# Patient Record
Sex: FEMALE | Race: WHITE | URBAN - METROPOLITAN AREA
[De-identification: names, ages, dates, MRNs, and addresses within clinical notes are randomized per-mention and may not be internally consistent; named-entity substitution may affect disease eponyms.]

---

## 2019-09-12 ENCOUNTER — INPATIENT (INPATIENT)
Facility: HOSPITAL | Age: 54
LOS: 5 days | Discharge: HOME HEALTH | End: 2019-09-18
Attending: HOSPITALIST | Admitting: HOSPITALIST
Payer: MEDICAID

## 2019-09-12 VITALS
HEIGHT: 60 IN | TEMPERATURE: 97 F | SYSTOLIC BLOOD PRESSURE: 119 MMHG | WEIGHT: 104.94 LBS | DIASTOLIC BLOOD PRESSURE: 55 MMHG | OXYGEN SATURATION: 99 % | HEART RATE: 70 BPM | RESPIRATION RATE: 18 BRPM

## 2019-09-12 DIAGNOSIS — K21.9 GASTRO-ESOPHAGEAL REFLUX DISEASE WITHOUT ESOPHAGITIS: ICD-10-CM

## 2019-09-12 DIAGNOSIS — L03.119 CELLULITIS OF UNSPECIFIED PART OF LIMB: ICD-10-CM

## 2019-09-12 DIAGNOSIS — F41.9 ANXIETY DISORDER, UNSPECIFIED: ICD-10-CM

## 2019-09-12 DIAGNOSIS — Z98.891 HISTORY OF UTERINE SCAR FROM PREVIOUS SURGERY: Chronic | ICD-10-CM

## 2019-09-12 LAB
ALBUMIN SERPL ELPH-MCNC: 4.4 G/DL — SIGNIFICANT CHANGE UP (ref 3.5–5.2)
ALP SERPL-CCNC: 100 U/L — SIGNIFICANT CHANGE UP (ref 30–115)
ALT FLD-CCNC: 10 U/L — SIGNIFICANT CHANGE UP (ref 0–41)
ANION GAP SERPL CALC-SCNC: 10 MMOL/L — SIGNIFICANT CHANGE UP (ref 7–14)
AST SERPL-CCNC: 15 U/L — SIGNIFICANT CHANGE UP (ref 0–41)
BASOPHILS # BLD AUTO: 0.06 K/UL — SIGNIFICANT CHANGE UP (ref 0–0.2)
BASOPHILS NFR BLD AUTO: 0.5 % — SIGNIFICANT CHANGE UP (ref 0–1)
BILIRUB SERPL-MCNC: <0.2 MG/DL — SIGNIFICANT CHANGE UP (ref 0.2–1.2)
BUN SERPL-MCNC: 23 MG/DL — HIGH (ref 10–20)
CALCIUM SERPL-MCNC: 9.8 MG/DL — SIGNIFICANT CHANGE UP (ref 8.5–10.1)
CHLORIDE SERPL-SCNC: 103 MMOL/L — SIGNIFICANT CHANGE UP (ref 98–110)
CO2 SERPL-SCNC: 28 MMOL/L — SIGNIFICANT CHANGE UP (ref 17–32)
CREAT SERPL-MCNC: 0.7 MG/DL — SIGNIFICANT CHANGE UP (ref 0.7–1.5)
EOSINOPHIL # BLD AUTO: 0.23 K/UL — SIGNIFICANT CHANGE UP (ref 0–0.7)
EOSINOPHIL NFR BLD AUTO: 1.9 % — SIGNIFICANT CHANGE UP (ref 0–8)
ERYTHROCYTE [SEDIMENTATION RATE] IN BLOOD: 25 MM/HR — HIGH (ref 0–20)
GLUCOSE SERPL-MCNC: 87 MG/DL — SIGNIFICANT CHANGE UP (ref 70–99)
HCG SERPL-ACNC: <0.6 MIU/ML — SIGNIFICANT CHANGE UP
HCT VFR BLD CALC: 39.1 % — SIGNIFICANT CHANGE UP (ref 37–47)
HGB BLD-MCNC: 13.1 G/DL — SIGNIFICANT CHANGE UP (ref 12–16)
IMM GRANULOCYTES NFR BLD AUTO: 0.3 % — SIGNIFICANT CHANGE UP (ref 0.1–0.3)
LYMPHOCYTES # BLD AUTO: 1.22 K/UL — SIGNIFICANT CHANGE UP (ref 1.2–3.4)
LYMPHOCYTES # BLD AUTO: 9.9 % — LOW (ref 20.5–51.1)
MCHC RBC-ENTMCNC: 32.9 PG — HIGH (ref 27–31)
MCHC RBC-ENTMCNC: 33.5 G/DL — SIGNIFICANT CHANGE UP (ref 32–37)
MCV RBC AUTO: 98.2 FL — SIGNIFICANT CHANGE UP (ref 81–99)
MONOCYTES # BLD AUTO: 1.35 K/UL — HIGH (ref 0.1–0.6)
MONOCYTES NFR BLD AUTO: 10.9 % — HIGH (ref 1.7–9.3)
MRSA PCR RESULT.: NEGATIVE — SIGNIFICANT CHANGE UP
NEUTROPHILS # BLD AUTO: 9.43 K/UL — HIGH (ref 1.4–6.5)
NEUTROPHILS NFR BLD AUTO: 76.5 % — HIGH (ref 42.2–75.2)
NRBC # BLD: 0 /100 WBCS — SIGNIFICANT CHANGE UP (ref 0–0)
PLATELET # BLD AUTO: 220 K/UL — SIGNIFICANT CHANGE UP (ref 130–400)
POTASSIUM SERPL-MCNC: 4.2 MMOL/L — SIGNIFICANT CHANGE UP (ref 3.5–5)
POTASSIUM SERPL-SCNC: 4.2 MMOL/L — SIGNIFICANT CHANGE UP (ref 3.5–5)
PROT SERPL-MCNC: 7.3 G/DL — SIGNIFICANT CHANGE UP (ref 6–8)
RBC # BLD: 3.98 M/UL — LOW (ref 4.2–5.4)
RBC # FLD: 13.1 % — SIGNIFICANT CHANGE UP (ref 11.5–14.5)
SODIUM SERPL-SCNC: 141 MMOL/L — SIGNIFICANT CHANGE UP (ref 135–146)
WBC # BLD: 12.33 K/UL — HIGH (ref 4.8–10.8)
WBC # FLD AUTO: 12.33 K/UL — HIGH (ref 4.8–10.8)

## 2019-09-12 PROCEDURE — 99223 1ST HOSP IP/OBS HIGH 75: CPT

## 2019-09-12 PROCEDURE — 73610 X-RAY EXAM OF ANKLE: CPT | Mod: 26,LT

## 2019-09-12 PROCEDURE — 99285 EMERGENCY DEPT VISIT HI MDM: CPT

## 2019-09-12 PROCEDURE — 73630 X-RAY EXAM OF FOOT: CPT | Mod: 26,LT

## 2019-09-12 RX ORDER — COLLAGENASE CLOSTRIDIUM HIST. 250 UNIT/G
1 OINTMENT (GRAM) TOPICAL THREE TIMES A DAY
Refills: 0 | Status: DISCONTINUED | OUTPATIENT
Start: 2019-09-12 | End: 2019-09-12

## 2019-09-12 RX ORDER — DIAZEPAM 5 MG
5 TABLET ORAL THREE TIMES A DAY
Refills: 0 | Status: DISCONTINUED | OUTPATIENT
Start: 2019-09-12 | End: 2019-09-13

## 2019-09-12 RX ORDER — MORPHINE SULFATE 50 MG/1
2 CAPSULE, EXTENDED RELEASE ORAL EVERY 4 HOURS
Refills: 0 | Status: DISCONTINUED | OUTPATIENT
Start: 2019-09-12 | End: 2019-09-13

## 2019-09-12 RX ORDER — ACETAMINOPHEN 500 MG
650 TABLET ORAL EVERY 6 HOURS
Refills: 0 | Status: DISCONTINUED | OUTPATIENT
Start: 2019-09-12 | End: 2019-09-13

## 2019-09-12 RX ORDER — HYDROMORPHONE HYDROCHLORIDE 2 MG/ML
1 INJECTION INTRAMUSCULAR; INTRAVENOUS; SUBCUTANEOUS ONCE
Refills: 0 | Status: DISCONTINUED | OUTPATIENT
Start: 2019-09-12 | End: 2019-09-12

## 2019-09-12 RX ORDER — SODIUM HYPOCHLORITE 0.125 %
1 SOLUTION, NON-ORAL MISCELLANEOUS THREE TIMES A DAY
Refills: 0 | Status: DISCONTINUED | OUTPATIENT
Start: 2019-09-12 | End: 2019-09-12

## 2019-09-12 RX ORDER — PANTOPRAZOLE SODIUM 20 MG/1
40 TABLET, DELAYED RELEASE ORAL
Refills: 0 | Status: DISCONTINUED | OUTPATIENT
Start: 2019-09-12 | End: 2019-09-13

## 2019-09-12 RX ORDER — VANCOMYCIN HCL 1 G
1000 VIAL (EA) INTRAVENOUS EVERY 12 HOURS
Refills: 0 | Status: DISCONTINUED | OUTPATIENT
Start: 2019-09-12 | End: 2019-09-13

## 2019-09-12 RX ORDER — AZTREONAM 2 G
1 VIAL (EA) INJECTION
Qty: 20 | Refills: 0
Start: 2019-09-12 | End: 2019-09-21

## 2019-09-12 RX ORDER — SERTRALINE 25 MG/1
100 TABLET, FILM COATED ORAL DAILY
Refills: 0 | Status: DISCONTINUED | OUTPATIENT
Start: 2019-09-12 | End: 2019-09-13

## 2019-09-12 RX ORDER — KETOROLAC TROMETHAMINE 30 MG/ML
15 SYRINGE (ML) INJECTION ONCE
Refills: 0 | Status: DISCONTINUED | OUTPATIENT
Start: 2019-09-12 | End: 2019-09-12

## 2019-09-12 RX ORDER — VANCOMYCIN HCL 1 G
1000 VIAL (EA) INTRAVENOUS ONCE
Refills: 0 | Status: COMPLETED | OUTPATIENT
Start: 2019-09-12 | End: 2019-09-12

## 2019-09-12 RX ORDER — ENOXAPARIN SODIUM 100 MG/ML
40 INJECTION SUBCUTANEOUS DAILY
Refills: 0 | Status: DISCONTINUED | OUTPATIENT
Start: 2019-09-12 | End: 2019-09-13

## 2019-09-12 RX ADMIN — Medication 250 MILLIGRAM(S): at 11:17

## 2019-09-12 RX ADMIN — Medication 15 MILLIGRAM(S): at 11:38

## 2019-09-12 RX ADMIN — HYDROMORPHONE HYDROCHLORIDE 1 MILLIGRAM(S): 2 INJECTION INTRAMUSCULAR; INTRAVENOUS; SUBCUTANEOUS at 21:15

## 2019-09-12 RX ADMIN — HYDROMORPHONE HYDROCHLORIDE 1 MILLIGRAM(S): 2 INJECTION INTRAMUSCULAR; INTRAVENOUS; SUBCUTANEOUS at 20:39

## 2019-09-12 RX ADMIN — Medication 250 MILLIGRAM(S): at 18:24

## 2019-09-12 RX ADMIN — ENOXAPARIN SODIUM 40 MILLIGRAM(S): 100 INJECTION SUBCUTANEOUS at 18:24

## 2019-09-12 RX ADMIN — MORPHINE SULFATE 2 MILLIGRAM(S): 50 CAPSULE, EXTENDED RELEASE ORAL at 17:19

## 2019-09-12 NOTE — ED PROVIDER NOTE - OBJECTIVE STATEMENT
53 y f no pmh pw L foot pain and swelling. Picked something out of her L 2nd toe with tweezers about 1 week ago. Since then has been noticing swelling and clear drainage from 2nd toe. Today got out of bed and noted increased swelling to the L foot and drainage from the L 2nd toe. Toe pain radiating up towards foot and L ankle. Unable to bear weight over extremity. Denies fever, chills, n/v, back pain, ankle pain, calf pain, knee pain, decreased sensation, trauma.

## 2019-09-12 NOTE — ED PROVIDER NOTE - PHYSICAL EXAMINATION
CONSTITUTIONAL: Well-developed; well-nourished; in no acute distress.   SKIN: Erythema and swelling over L 2nd toe with mild erythema over foot.   HEAD: Normocephalic; atraumatic.  EYES: normal sclera and conjunctiva   ENT: No nasal discharge; airway clear.  NECK: Supple; non tender.  CARD: S1, S2 normal; no murmurs, gallops, or rubs. Regular rate and rhythm.   RESP: No wheezes, rales or rhonchi.  ABD: soft ntnd  EXT: Normal ROM.  No clubbing, cyanosis. L foot swelling and erythema. DP/Pt intact and equal bilaterally.   LYMPH: No acute cervical adenopathy.  NEURO: Alert, oriented, grossly unremarkable  PSYCH: Cooperative, appropriate.

## 2019-09-12 NOTE — ED PROVIDER NOTE - NSFOLLOWUPCLINICS_GEN_ALL_ED_FT
[facility]  Podiatry  .  [place]   Phone: [phone]  Fax:   Follow Up Time:

## 2019-09-12 NOTE — ED PROVIDER NOTE - PROGRESS NOTE DETAILS
D/W podiatry. Recommending admission for IVABX, xrays, labs including ESR and CRP. Patient states she needs to leave. Refusing admission and further treatment with antibiotics. Requesting pain medicine and to be sent home. The patient wishes to leave against medical advice.  I have discussed the risks, benefits and alternatives (including the possibility of worsening of disease, pain, permanent disability, and/or death) with the patient and his/her family (if available).  The patient voices understanding of these risks, benefits, and alternatives and still wishes to sign out against medical advice.  The patient is awake, alert, oriented  x 3 and has demonstrated capacity to refuse/direct care.  I have advised the patient that they can and should return immediately should they develop any worse/different/additional symptoms, or if they change their mind and want to continue their care. Patient refusing to sign ama paperwork, states she does not want to sign anything at this time. Pt returned to ED after smoking a cigarette stating she was only leaving for a moment. Now agreeing to be admitted.

## 2019-09-12 NOTE — H&P ADULT - NSHPPHYSICALEXAM_GEN_ALL_CORE
Vital Signs Last 24 Hrs  T(C): 37.1 (12 Sep 2019 15:23), Max: 37.1 (12 Sep 2019 15:23)  T(F): 98.8 (12 Sep 2019 15:23), Max: 98.8 (12 Sep 2019 15:23)  HR: 60 (12 Sep 2019 15:23) (60 - 70)  BP: 135/80 (12 Sep 2019 15:23) (119/55 - 135/80)  BP(mean): --  RR: 18 (12 Sep 2019 15:23) (18 - 18)  SpO2: 100% (12 Sep 2019 15:23) (99% - 100%)    T(C): 37.1 (09-12-19 @ 15:23), Max: 37.1 (09-12-19 @ 15:23)  HR: 60 (09-12-19 @ 15:23) (60 - 70)  BP: 135/80 (09-12-19 @ 15:23) (119/55 - 135/80)  RR: 18 (09-12-19 @ 15:23) (18 - 18)  SpO2: 100% (09-12-19 @ 15:23) (99% - 100%)    PHYSICAL EXAM:  GENERAL: NAD, well-developed, well nourished, looks stated age  HEAD:  Atraumatic, Normocephalic  EYES: EOMI, PERRLA, conjunctiva and sclera clear  NECK: Supple, No JVD, no bruits, no masses, no thyroid enlargement  ENMT: No tonsillar erythema, exudated or enlargement, moist mucous membranes  CHEST/LUNG: Clear to auscultation bilaterally; No rales, rhonchi or wheezing, no dullness to percussion  HEART: S1,S2 Regular rate and rhythm; No murmurs, rubs, or gallops  ABDOMEN: Soft, nontender, nondistended, no rebound tenderness; No palpable masses, no hernias, no organomegaly; Bowel sounds present and normoactive  EXTREMITIES:  2+ peripheral pulses bilaterally and symmetrically, no clubbing, cyanosis. Left second toe with edema and erythema extending to top part of foot ++tenderness  LYMPH: No lymphadenopathy  PSYCH: AAOx3, normal mood and affect  NEUROLOGY: non-focal, muscle strength 5/5 all extremities, DTRs 2+ symmetrically  SKIN: No rashes or lesions

## 2019-09-12 NOTE — H&P ADULT - ATTENDING COMMENTS
pt seen and examined independently     53F who picked at a wart on her toe, most recently a few days ago with a tweezer.  Since then, she has had increasing pain and inability to ambulate due to pain and a mechanical fall.  denies fever.  comprehensive ROS otherwise negative except those mentioned in the HPI.  for PMH/SHx/FHx, see above.    T(F): 98.8 (09-12-19 @ 15:23), Max: 98.8 (09-12-19 @ 15:23)  HR: 60 (09-12-19 @ 15:23)  BP: 135/80 (09-12-19 @ 15:23) (119/55 - 135/80)  RR: 18 (09-12-19 @ 15:23)  SpO2: 100% (09-12-19 @ 15:23)    GENERAL: NAD  HEENT: MMM +Lt lateral eye ecchymosis  CHEST/LUNG: Good air entry, No wheezing  HEART: RRR, No murmurs  ABDOMEN: Soft, Bowel sounds present  EXTREMITIES:  Lt 2nd toe with puncture point, with surrounding cellulitis and erythema +tenderness  NEURO: AOx3                          13.1   12.33 )-----------( 220      ( 12 Sep 2019 11:20 )             39.1     09-12    141  |  103  |  23<H>  ----------------------------<  87  4.2   |  28  |  0.7    Ca    9.8      12 Sep 2019 11:20    TPro  7.3  /  Alb  4.4  /  TBili  <0.2  /  DBili  x   /  AST  15  /  ALT  10  /  AlkPhos  100  09-12      #Lt toe/foot cellulitis - currently no sepsis.  check MRSA nares, if negative, consider changing abx from vanc to unasyn.  podiatry and ID eval  #anxiety/depression - continue zoloft and valium   #GERD - continue PPI  #PPx - lovenox

## 2019-09-12 NOTE — ED PROVIDER NOTE - NS ED ROS FT
Eyes:  No visual changes, eye pain or discharge.  ENMT:  No hearing changes, pain, discharge or infections. No neck pain or stiffness.  Cardiac:  No chest pain, SOB or edema.   Respiratory:  No cough or respiratory distress.  GI:  No nausea, vomiting, diarrhea or abdominal pain.  :  No dysuria, frequency or burning.  MS: L foot pain, swelling, erythema from L 2nd toe with swelling extending towards dorsum of L foot. No myalgia, muscle weakness, or back pain.  Neuro:  No headache or weakness.  No LOC.  Skin: Erythema and drainage over L 2nd toe.   Endocrine: No history of thyroid disease or diabetes.

## 2019-09-12 NOTE — H&P ADULT - ASSESSMENT
54 yo female [place] [facility] [place] infection.      D/W Dr Oliveira

## 2019-09-12 NOTE — H&P ADULT - NSHPLABSRESULTS_GEN_ALL_CORE
13.1   12.33 )-----------( 220      ( 12 Sep 2019 11:20 )             39.1       09-12    141  |  103  |  23<H>  ----------------------------<  87  4.2   |  28  |  0.7    Ca    9.8      12 Sep 2019 11:20    TPro  7.3  /  Alb  4.4  /  TBili  <0.2  /  DBili  x   /  AST  15  /  ALT  10  /  AlkPhos  100  09-12                          < from: Xray Foot AP + Lateral + Oblique, Left (09.12.19 @ 10:58) >      INTERPRETATION:  Clinical History / Reason for exam: Pain  3. Images  No comparison  No bony soft tissue or articular abnormality is seen.  It should be noted thatosteomyelitis/septic arthritis cannot be excluded   on this plain film study.  Impression  Normal exam                  MONO RHODES M.D., ATTENDING RADIOLOGIST  This document has been electronically signed. Sep 12 2019  1:47PM                < end of copied text >

## 2019-09-12 NOTE — H&P ADULT - HISTORY OF PRESENT ILLNESS
54 yo female sent in by [facility] [place] infected L second toe. Pt states there was a "pimple" there that she popped 4 weeks ago - clear fluid drained but it reformed. She then squeezed it again 3 days ago and got more clear fluid. 1 day later she began having pain, redness and swelling. Admits to chills, but hasnt taken her temp.  She states she can not put any pressure on her foot and had a fall last night while trying to climb the stairs because of it. Pt states she hit the left side of her face on the banister.    Denies LOC, dizziness, HA or visual changes.

## 2019-09-12 NOTE — CHART NOTE - NSCHARTNOTEFT_GEN_A_CORE
Patient seen and examined throughout the course of the day.  Results of Labs/Imaging discussed as well as patient's plan.  All patient's questions answered.

## 2019-09-13 LAB
APTT BLD: 36.9 SEC — SIGNIFICANT CHANGE UP (ref 27–39.2)
BASOPHILS # BLD AUTO: 0.04 K/UL — SIGNIFICANT CHANGE UP (ref 0–0.2)
BASOPHILS NFR BLD AUTO: 0.4 % — SIGNIFICANT CHANGE UP (ref 0–1)
BLD GP AB SCN SERPL QL: SIGNIFICANT CHANGE UP
CRP SERPL-MCNC: 1.52 MG/DL — HIGH (ref 0–0.4)
EOSINOPHIL # BLD AUTO: 0.25 K/UL — SIGNIFICANT CHANGE UP (ref 0–0.7)
EOSINOPHIL NFR BLD AUTO: 2.2 % — SIGNIFICANT CHANGE UP (ref 0–8)
HCT VFR BLD CALC: 40.5 % — SIGNIFICANT CHANGE UP (ref 37–47)
HGB BLD-MCNC: 13.3 G/DL — SIGNIFICANT CHANGE UP (ref 12–16)
IMM GRANULOCYTES NFR BLD AUTO: 0.4 % — HIGH (ref 0.1–0.3)
INR BLD: 1.03 RATIO — SIGNIFICANT CHANGE UP (ref 0.65–1.3)
LYMPHOCYTES # BLD AUTO: 1.61 K/UL — SIGNIFICANT CHANGE UP (ref 1.2–3.4)
LYMPHOCYTES # BLD AUTO: 14.2 % — LOW (ref 20.5–51.1)
MCHC RBC-ENTMCNC: 32.8 G/DL — SIGNIFICANT CHANGE UP (ref 32–37)
MCHC RBC-ENTMCNC: 32.8 PG — HIGH (ref 27–31)
MCV RBC AUTO: 100 FL — HIGH (ref 81–99)
MONOCYTES # BLD AUTO: 1 K/UL — HIGH (ref 0.1–0.6)
MONOCYTES NFR BLD AUTO: 8.8 % — SIGNIFICANT CHANGE UP (ref 1.7–9.3)
NEUTROPHILS # BLD AUTO: 8.42 K/UL — HIGH (ref 1.4–6.5)
NEUTROPHILS NFR BLD AUTO: 74 % — SIGNIFICANT CHANGE UP (ref 42.2–75.2)
NRBC # BLD: 0 /100 WBCS — SIGNIFICANT CHANGE UP (ref 0–0)
PLATELET # BLD AUTO: 223 K/UL — SIGNIFICANT CHANGE UP (ref 130–400)
PROTHROM AB SERPL-ACNC: 11.8 SEC — SIGNIFICANT CHANGE UP (ref 9.95–12.87)
RBC # BLD: 4.05 M/UL — LOW (ref 4.2–5.4)
RBC # FLD: 13.1 % — SIGNIFICANT CHANGE UP (ref 11.5–14.5)
WBC # BLD: 11.36 K/UL — HIGH (ref 4.8–10.8)
WBC # FLD AUTO: 11.36 K/UL — HIGH (ref 4.8–10.8)

## 2019-09-13 PROCEDURE — 28002 TREATMENT OF FOOT INFECTION: CPT

## 2019-09-13 PROCEDURE — 99233 SBSQ HOSP IP/OBS HIGH 50: CPT

## 2019-09-13 PROCEDURE — 99222 1ST HOSP IP/OBS MODERATE 55: CPT

## 2019-09-13 PROCEDURE — 17110 DESTRUCTION B9 LES UP TO 14: CPT | Mod: 59

## 2019-09-13 PROCEDURE — 71045 X-RAY EXAM CHEST 1 VIEW: CPT | Mod: 26

## 2019-09-13 RX ORDER — MORPHINE SULFATE 50 MG/1
2 CAPSULE, EXTENDED RELEASE ORAL EVERY 6 HOURS
Refills: 0 | Status: DISCONTINUED | OUTPATIENT
Start: 2019-09-13 | End: 2019-09-18

## 2019-09-13 RX ORDER — HYDROMORPHONE HYDROCHLORIDE 2 MG/ML
0.5 INJECTION INTRAMUSCULAR; INTRAVENOUS; SUBCUTANEOUS
Refills: 0 | Status: DISCONTINUED | OUTPATIENT
Start: 2019-09-13 | End: 2019-09-13

## 2019-09-13 RX ORDER — OXYCODONE AND ACETAMINOPHEN 5; 325 MG/1; MG/1
1 TABLET ORAL ONCE
Refills: 0 | Status: DISCONTINUED | OUTPATIENT
Start: 2019-09-13 | End: 2019-09-13

## 2019-09-13 RX ORDER — VANCOMYCIN HCL 1 G
1000 VIAL (EA) INTRAVENOUS EVERY 12 HOURS
Refills: 0 | Status: DISCONTINUED | OUTPATIENT
Start: 2019-09-13 | End: 2019-09-16

## 2019-09-13 RX ORDER — DIAZEPAM 5 MG
5 TABLET ORAL THREE TIMES A DAY
Refills: 0 | Status: DISCONTINUED | OUTPATIENT
Start: 2019-09-13 | End: 2019-09-18

## 2019-09-13 RX ORDER — MORPHINE SULFATE 50 MG/1
4 CAPSULE, EXTENDED RELEASE ORAL EVERY 4 HOURS
Refills: 0 | Status: DISCONTINUED | OUTPATIENT
Start: 2019-09-13 | End: 2019-09-18

## 2019-09-13 RX ORDER — SODIUM CHLORIDE 9 MG/ML
1000 INJECTION, SOLUTION INTRAVENOUS
Refills: 0 | Status: DISCONTINUED | OUTPATIENT
Start: 2019-09-13 | End: 2019-09-13

## 2019-09-13 RX ORDER — SERTRALINE 25 MG/1
100 TABLET, FILM COATED ORAL DAILY
Refills: 0 | Status: DISCONTINUED | OUTPATIENT
Start: 2019-09-13 | End: 2019-09-18

## 2019-09-13 RX ORDER — ACETAMINOPHEN 500 MG
650 TABLET ORAL ONCE
Refills: 0 | Status: DISCONTINUED | OUTPATIENT
Start: 2019-09-13 | End: 2019-09-13

## 2019-09-13 RX ORDER — MORPHINE SULFATE 50 MG/1
4 CAPSULE, EXTENDED RELEASE ORAL ONCE
Refills: 0 | Status: DISCONTINUED | OUTPATIENT
Start: 2019-09-13 | End: 2019-09-13

## 2019-09-13 RX ORDER — ENOXAPARIN SODIUM 100 MG/ML
40 INJECTION SUBCUTANEOUS DAILY
Refills: 0 | Status: DISCONTINUED | OUTPATIENT
Start: 2019-09-13 | End: 2019-09-18

## 2019-09-13 RX ORDER — PANTOPRAZOLE SODIUM 20 MG/1
40 TABLET, DELAYED RELEASE ORAL
Refills: 0 | Status: DISCONTINUED | OUTPATIENT
Start: 2019-09-13 | End: 2019-09-18

## 2019-09-13 RX ADMIN — MORPHINE SULFATE 2 MILLIGRAM(S): 50 CAPSULE, EXTENDED RELEASE ORAL at 19:54

## 2019-09-13 RX ADMIN — MORPHINE SULFATE 2 MILLIGRAM(S): 50 CAPSULE, EXTENDED RELEASE ORAL at 05:44

## 2019-09-13 RX ADMIN — MORPHINE SULFATE 2 MILLIGRAM(S): 50 CAPSULE, EXTENDED RELEASE ORAL at 15:49

## 2019-09-13 RX ADMIN — Medication 250 MILLIGRAM(S): at 05:44

## 2019-09-13 RX ADMIN — SODIUM CHLORIDE 100 MILLILITER(S): 9 INJECTION, SOLUTION INTRAVENOUS at 18:42

## 2019-09-13 RX ADMIN — MORPHINE SULFATE 4 MILLIGRAM(S): 50 CAPSULE, EXTENDED RELEASE ORAL at 22:25

## 2019-09-13 RX ADMIN — MORPHINE SULFATE 4 MILLIGRAM(S): 50 CAPSULE, EXTENDED RELEASE ORAL at 09:30

## 2019-09-13 RX ADMIN — SERTRALINE 100 MILLIGRAM(S): 25 TABLET, FILM COATED ORAL at 11:17

## 2019-09-13 RX ADMIN — PANTOPRAZOLE SODIUM 40 MILLIGRAM(S): 20 TABLET, DELAYED RELEASE ORAL at 05:43

## 2019-09-13 RX ADMIN — MORPHINE SULFATE 2 MILLIGRAM(S): 50 CAPSULE, EXTENDED RELEASE ORAL at 15:38

## 2019-09-13 RX ADMIN — MORPHINE SULFATE 4 MILLIGRAM(S): 50 CAPSULE, EXTENDED RELEASE ORAL at 21:34

## 2019-09-13 RX ADMIN — Medication 250 MILLIGRAM(S): at 18:42

## 2019-09-13 RX ADMIN — MORPHINE SULFATE 4 MILLIGRAM(S): 50 CAPSULE, EXTENDED RELEASE ORAL at 10:17

## 2019-09-13 RX ADMIN — MORPHINE SULFATE 2 MILLIGRAM(S): 50 CAPSULE, EXTENDED RELEASE ORAL at 20:30

## 2019-09-13 RX ADMIN — MORPHINE SULFATE 2 MILLIGRAM(S): 50 CAPSULE, EXTENDED RELEASE ORAL at 01:44

## 2019-09-13 RX ADMIN — MORPHINE SULFATE 2 MILLIGRAM(S): 50 CAPSULE, EXTENDED RELEASE ORAL at 11:18

## 2019-09-13 RX ADMIN — MORPHINE SULFATE 2 MILLIGRAM(S): 50 CAPSULE, EXTENDED RELEASE ORAL at 12:35

## 2019-09-13 RX ADMIN — MORPHINE SULFATE 2 MILLIGRAM(S): 50 CAPSULE, EXTENDED RELEASE ORAL at 00:37

## 2019-09-13 NOTE — CONSULT NOTE ADULT - SUBJECTIVE AND OBJECTIVE BOX
PODIATRY CONSULT   GILMA CROWDER is a pleasant well-nourished, well developed 53y Female in no acute distress, alert awake, and oriented to person, place and time.   Patient is a 53y old  Female who presents with a chief complaint of infected toe (12 Sep 2019 16:08)    HPI:  52 yo [place] [facility] [place] second [place]. Pt states there was a "pimple" there that she popped 4 weeks ago - clear fluid drained but it reformed. She then squeezed it again 3 days ago and got more clear fluid. 1 day later she began having pain, redness and swelling. Admits to chills, but hasnt taken her temp.  She states she can not put any pressure on her foot and had a fall last night while trying to climb the stairs because of it. Pt states she hit the left side of her face on the banister.    Denies LOC, dizziness, HA or visual changes. (12 Sep 2019 16:08)    CELLULITIS AND ABSCESS OF FOOT  Anxiety and depression      PMH: CELLULITIS AND ABSCESS OF FOOT  Anxiety and depression    PSH: H/O  section    Medication vancomycin  IVPB 1000 milliGRAM(s) IV Intermittent every 12 hours    Allergy: No Known Allergies        Labs:                        13.1   12.33 )-----------( 220      ( 12 Sep 2019 11:20 )             39.1       -    141  |  103  |  23<H>  ----------------------------<  87  4.2   |  28  |  0.7    Ca    9.8      12 Sep 2019 11:20    TPro  7.3  /  Alb  4.4  /  TBili  <0.2  /  DBili  x   /  AST  15  /  ALT  10  /  AlkPhos  100  -12            O:   Derm: wart look like ulcer with erythema/edema/cellulitis fluctuance  left 2nd digit.  Vascular: Dorsalis Pedis and Posterior Tibial pulses 2/4.  Capillary re-fill time less then 3 seconds digits 1-5 left foot, warm to to touch  Neuro: Protective sensation intact to the level of the digits left foot.  MSK: HAV Left, 10/10 pain on palpation        Assessment and Plan:   left 2nd digit cellulitis  possible abscess    Chart reviewed and Patient evaluated  Discussed diagnosis and treatment with patient  xray reviewed  recommend ID consult  Continue IV abx as per ID  sx booked today I&D left foot  NPO NOW  req medical clearance and or stratification  optimize pt prior to sx  attending updated

## 2019-09-13 NOTE — CONSULT NOTE ADULT - SUBJECTIVE AND OBJECTIVE BOX
VELGILMA  53y, Female  Allergy: No Known Allergies      CHIEF COMPLAINT: infected toe (13 Sep 2019 07:44)      HPI:  54 yo female sent in by [facility] [place] for infected L second toe. Pt states there was a "pimple" there that she popped 4 weeks ago - clear fluid drained but it reformed. She then squeezed it again 3 days ago and got more clear fluid. 1 day later she began having pain, redness and swelling. Admits to chills, but hasnt taken her temp.  She states she can not put any pressure on her foot and had a fall last night while trying to climb the stairs because of it. Pt states she hit the left side of her face on the banister.    Denies LOC, dizziness, HA or visual changes. (12 Sep 2019 16:08)    FAMILY HISTORY:    PAST MEDICAL & SURGICAL HISTORY:  Anxiety and depression  H/O  section      Substance Use ( X ) never used  (  ) IVDU (  ) Other:  Tobacco Usage:  (   ) never smoked   (   ) former smoker   ( X  ) current smoker   Alcohol Usage: ( X  ) social  (   ) daily use (   ) denies  Sexual History: not relevant       ROS  10 system review - pain Left foot     VITALS:  T(F): 97.6, Max: 98.8 (19 @ 15:23)  HR: 77  BP: 108/54  RR: 16Vital Signs Last 24 Hrs  T(C): 36.4 (13 Sep 2019 05:00), Max: 37.1 (12 Sep 2019 15:23)  T(F): 97.6 (13 Sep 2019 05:00), Max: 98.8 (12 Sep 2019 15:23)  HR: 77 (13 Sep 2019 05:00) (60 - 77)  BP: 108/54 (13 Sep 2019 05:00) (107/54 - 135/80)  BP(mean): --  RR: 16 (13 Sep 2019 05:00) (16 - 18)  SpO2: 98% (12 Sep 2019 21:33) (98% - 100%)    PHYSICAL EXAM:  Gen: NAD, resting in bed  HEENT: Normocephalic, atraumatic  Neck: supple, no lymphadenopathy  CV: s1 s 2+  Lungs: clear   Abdomen: Soft, BS present  Ext: Warm, well perfused. L 2nd toe - red , swollen with spot of necrosis  Neuro: non focal, awake  Skin: no rash    TESTS & MEASUREMENTS:                        13.1   12.33 )-----------( 220      ( 12 Sep 2019 11:20 )             39.1         141  |  103  |  23<H>  ----------------------------<  87  4.2   |  28  |  0.7    Ca    9.8      12 Sep 2019 11:20    TPro  7.3  /  Alb  4.4  /  TBili  <0.2  /  DBili  x   /  AST  15  /  ALT  10  /  AlkPhos  100      eGFR if Non African American: 98 mL/min/1.73M2 (19 @ 11:20)  eGFR if African American: 114 mL/min/1.73M2 (19 @ 11:20)    LIVER FUNCTIONS - ( 12 Sep 2019 11:20 )  Alb: 4.4 g/dL / Pro: 7.3 g/dL / ALK PHOS: 100 U/L / ALT: 10 U/L / AST: 15 U/L / GGT: x                     INFECTIOUS DISEASES TESTING  MRSA PCR Result.: Negative (19 @ 18:18)      RADIOLOGY & ADDITIONAL TESTS:      CARDIOLOGY TESTING      MEDICATIONS  enoxaparin Injectable 40  morphine  - Injectable 4  pantoprazole    Tablet 40  sertraline 100  vancomycin  IVPB 1000      ANTIBIOTICS:  vancomycin  IVPB 1000 milliGRAM(s) IV Intermittent every 12 hours

## 2019-09-13 NOTE — BRIEF OPERATIVE NOTE - NSICDXBRIEFPREOP_GEN_ALL_CORE_FT
PRE-OP DIAGNOSIS:  Verruca 13-Sep-2019 17:57:31  Rocky Pepper  Toe abscess 13-Sep-2019 17:57:03  Rocky Pepper

## 2019-09-13 NOTE — BRIEF OPERATIVE NOTE - NSICDXBRIEFPOSTOP_GEN_ALL_CORE_FT
POST-OP DIAGNOSIS:  Verruca 13-Sep-2019 17:58:03  Rocky Pepper  Toe abscess 13-Sep-2019 17:57:50  Rocky Pepper

## 2019-09-13 NOTE — PROGRESS NOTE ADULT - SUBJECTIVE AND OBJECTIVE BOX
GILMA CROWDER  53y  Female      Patient is a 53y old  Female who presents with a chief complaint of infected toe (13 Sep 2019 09:09)      INTERVAL HPI/OVERNIGHT EVENTS: c/o severe toe pain, unable to bear weight. denies any cardiovascular history or heart attacks or fam hx early MI      REVIEW OF SYSTEMS:  as above  All other review of systems negative    T(C): 35.3 (09-13-19 @ 14:00), Max: 37.1 (09-12-19 @ 15:23)  HR: 83 (09-13-19 @ 14:00) (60 - 83)  BP: 114/70 (09-13-19 @ 14:00) (107/54 - 135/80)  RR: 16 (09-13-19 @ 14:00) (16 - 18)  SpO2: 98% (09-12-19 @ 21:33) (98% - 100%)  Wt(kg): --Vital Signs Last 24 Hrs  T(C): 35.3 (13 Sep 2019 14:00), Max: 37.1 (12 Sep 2019 15:23)  T(F): 95.5 (13 Sep 2019 14:00), Max: 98.8 (12 Sep 2019 15:23)  HR: 83 (13 Sep 2019 14:00) (60 - 83)  BP: 114/70 (13 Sep 2019 14:00) (107/54 - 135/80)  BP(mean): --  RR: 16 (13 Sep 2019 14:00) (16 - 18)  SpO2: 98% (12 Sep 2019 21:33) (98% - 100%)        PHYSICAL EXAM:  GENERAL: NAD thin  PSYCH: no agitation, baseline mentation, anxious but consolable   NERVOUS SYSTEM:  Alert & Oriented X3, no new focal deficits  PULMONARY: Clear to percussion bilaterally; No rales, rhonchi, wheezing, or rubs  CARDIOVASCULAR: Regular rate and rhythm; No murmurs, rubs, or gallops  GI: Soft, Nontender, Nondistended; Bowel sounds present  EXTREMITIES:  2+ Peripheral Pulses, No clubbing, cyanosis, or edema; L 2nd digit wart, surrounding erythema/edema    Consultant(s) Notes Reviewed:  [x ] YES  [ ] NO    Discussed with Consultants/Other Providers [ x] YES     LABS                          13.3   11.36 )-----------( 223      ( 13 Sep 2019 07:41 )             40.5     09-12    141  |  103  |  23<H>  ----------------------------<  87  4.2   |  28  |  0.7    Ca    9.8      12 Sep 2019 11:20    TPro  7.3  /  Alb  4.4  /  TBili  <0.2  /  DBili  x   /  AST  15  /  ALT  10  /  AlkPhos  100  09-12        PT/INR - ( 13 Sep 2019 11:33 )   PT: 11.80 sec;   INR: 1.03 ratio         PTT - ( 13 Sep 2019 11:33 )  PTT:36.9 sec  Lactate Trend        CAPILLARY BLOOD GLUCOSE            RADIOLOGY & ADDITIONAL TESTS:    Imaging Personally Reviewed:  [ ] YES  [ ] NO    HEALTH ISSUES - PROBLEM Dx:  Gastroesophageal reflux disease, esophagitis presence not specified: Gastroesophageal reflux disease, esophagitis presence not specified  Anxiety and depression: Anxiety and depression  Cellulitis and abscess of foot: Cellulitis and abscess of foot

## 2019-09-13 NOTE — CONSULT NOTE ADULT - PROBLEM SELECTOR RECOMMENDATION 9
acute illness  needs I & d   then dc planning   Po Bactrim DS 2 tabs Q12 + Po Clinda 300 mg Q 6 - 7 days post op   follow up cx

## 2019-09-13 NOTE — BRIEF OPERATIVE NOTE - NSICDXBRIEFPROCEDURE_GEN_ALL_CORE_FT
PROCEDURES:  Desctruction of verruca 13-Sep-2019 17:59:26  Rocky Pepper  Incision and drainage of abscess of left foot 13-Sep-2019 17:58:54  Rocky Pepper

## 2019-09-13 NOTE — PROGRESS NOTE ADULT - ASSESSMENT
54yo f c PMHx anxiety/depression here with infected L foot 2nd digit, purulent cellulitis    pending I+D  will likely need packing  maintain on IV abx for now  analgesia  bowel regimen  low risk for surgical procedure, proceed with surgery- no further perioperative workup required- discussed with podiatry team  d/c planning after post op wound care checks if no need for further debridements and set up for wound care  as outpatient 52yo f c PMHx anxiety/depression here with infected L foot 2nd digit, purulent cellulitis    pending I+D  will likely need packing  maintain on IV abx for now  analgesia  bowel regimen  low risk for surgical procedure, proceed with surgery- no further perioperative workup required- discussed with podiatry team  d/c planning after post op wound care checks if no need for further debridements and set up for wound care  monitor K, Cr when transitioned to bactrim  f/u cultures from I+D  as outpatient

## 2019-09-14 LAB
HCV AB S/CO SERPL IA: 0.19 S/CO — SIGNIFICANT CHANGE UP (ref 0–0.99)
HCV AB SERPL-IMP: SIGNIFICANT CHANGE UP

## 2019-09-14 PROCEDURE — 73718 MRI LOWER EXTREMITY W/O DYE: CPT | Mod: 26,LT

## 2019-09-14 PROCEDURE — 99233 SBSQ HOSP IP/OBS HIGH 50: CPT

## 2019-09-14 RX ORDER — OFLOXACIN 0.3 %
1 DROPS OPHTHALMIC (EYE) DAILY
Refills: 0 | Status: DISCONTINUED | OUTPATIENT
Start: 2019-09-14 | End: 2019-09-18

## 2019-09-14 RX ORDER — SENNA PLUS 8.6 MG/1
2 TABLET ORAL AT BEDTIME
Refills: 0 | Status: DISCONTINUED | OUTPATIENT
Start: 2019-09-14 | End: 2019-09-18

## 2019-09-14 RX ORDER — DOCUSATE SODIUM 100 MG
100 CAPSULE ORAL THREE TIMES A DAY
Refills: 0 | Status: DISCONTINUED | OUTPATIENT
Start: 2019-09-14 | End: 2019-09-18

## 2019-09-14 RX ADMIN — Medication 250 MILLIGRAM(S): at 17:51

## 2019-09-14 RX ADMIN — SENNA PLUS 2 TABLET(S): 8.6 TABLET ORAL at 21:58

## 2019-09-14 RX ADMIN — ENOXAPARIN SODIUM 40 MILLIGRAM(S): 100 INJECTION SUBCUTANEOUS at 11:39

## 2019-09-14 RX ADMIN — Medication 250 MILLIGRAM(S): at 05:32

## 2019-09-14 RX ADMIN — MORPHINE SULFATE 4 MILLIGRAM(S): 50 CAPSULE, EXTENDED RELEASE ORAL at 12:00

## 2019-09-14 RX ADMIN — PANTOPRAZOLE SODIUM 40 MILLIGRAM(S): 20 TABLET, DELAYED RELEASE ORAL at 05:36

## 2019-09-14 RX ADMIN — MORPHINE SULFATE 4 MILLIGRAM(S): 50 CAPSULE, EXTENDED RELEASE ORAL at 15:46

## 2019-09-14 RX ADMIN — MORPHINE SULFATE 4 MILLIGRAM(S): 50 CAPSULE, EXTENDED RELEASE ORAL at 15:21

## 2019-09-14 RX ADMIN — SERTRALINE 100 MILLIGRAM(S): 25 TABLET, FILM COATED ORAL at 11:38

## 2019-09-14 RX ADMIN — MORPHINE SULFATE 2 MILLIGRAM(S): 50 CAPSULE, EXTENDED RELEASE ORAL at 01:26

## 2019-09-14 RX ADMIN — Medication 100 MILLIGRAM(S): at 21:58

## 2019-09-14 RX ADMIN — MORPHINE SULFATE 4 MILLIGRAM(S): 50 CAPSULE, EXTENDED RELEASE ORAL at 11:37

## 2019-09-14 RX ADMIN — MORPHINE SULFATE 4 MILLIGRAM(S): 50 CAPSULE, EXTENDED RELEASE ORAL at 05:30

## 2019-09-14 RX ADMIN — MORPHINE SULFATE 4 MILLIGRAM(S): 50 CAPSULE, EXTENDED RELEASE ORAL at 07:27

## 2019-09-14 RX ADMIN — MORPHINE SULFATE 2 MILLIGRAM(S): 50 CAPSULE, EXTENDED RELEASE ORAL at 18:00

## 2019-09-14 RX ADMIN — MORPHINE SULFATE 2 MILLIGRAM(S): 50 CAPSULE, EXTENDED RELEASE ORAL at 18:20

## 2019-09-14 RX ADMIN — MORPHINE SULFATE 4 MILLIGRAM(S): 50 CAPSULE, EXTENDED RELEASE ORAL at 21:58

## 2019-09-14 RX ADMIN — MORPHINE SULFATE 4 MILLIGRAM(S): 50 CAPSULE, EXTENDED RELEASE ORAL at 22:45

## 2019-09-14 NOTE — PROGRESS NOTE ADULT - ASSESSMENT
52yo f c PMHx anxiety/depression here with infected L foot 2nd digit, purulent cellulitis    s/p I+D, pending wound cx results, c/w iv abx for now  analgesia  bowel regimen  pending podiatry post op wound care checks if no need for further debridements and set up for wound care  monitor K, Cr when transitioned to bactrim  would AVOID weight bearing to toes for now, will elucidate weight bearing restrictions with podiatry upon f/u  will likely need PT/ gait training sessions when cleared for heel touch for cane training and appropriate offloading techniques  as outpatient         #Progress Note Handoff    Pending (specify):  Consults_________, Tests________, Test Results_______, Other_____post op wound care, on iv abx____    Family discussion: plan of care discussed with patient    Disposition: Home with home wound care

## 2019-09-14 NOTE — PROGRESS NOTE ADULT - SUBJECTIVE AND OBJECTIVE BOX
GILMA CROWDER  53y  Female      Patient is a 53y old  Female who presents with a chief complaint of infected toe (13 Sep 2019 14:52)      INTERVAL HPI/OVERNIGHT EVENTS: s/p debridement, c/o some pain at surgical site. otherwise doing well      REVIEW OF SYSTEMS:  as above  All other review of systems negative    T(C): 37.1 (09-14-19 @ 05:00), Max: 37.1 (09-13-19 @ 22:07)  HR: 56 (09-14-19 @ 05:00) (56 - 83)  BP: 100/50 (09-14-19 @ 05:00) (88/42 - 124/56)  RR: 18 (09-14-19 @ 05:00) (16 - 18)  SpO2: 99% (09-13-19 @ 18:25) (95% - 99%)  Wt(kg): --Vital Signs Last 24 Hrs  T(C): 37.1 (14 Sep 2019 05:00), Max: 37.1 (13 Sep 2019 22:07)  T(F): 98.7 (14 Sep 2019 05:00), Max: 98.7 (13 Sep 2019 22:07)  HR: 56 (14 Sep 2019 05:00) (56 - 83)  BP: 100/50 (14 Sep 2019 05:00) (88/42 - 124/56)  BP(mean): --  RR: 18 (14 Sep 2019 05:00) (16 - 18)  SpO2: 99% (13 Sep 2019 18:25) (95% - 99%)        PHYSICAL EXAM:  GENERAL: NAD, thin  PSYCH: no agitation, baseline mentation, anxious but consolable  NERVOUS SYSTEM:  Alert & Oriented X3, no new focal deficits  PULMONARY: Clear to percussion bilaterally; No rales, rhonchi, wheezing, or rubs  CARDIOVASCULAR: Regular rate and rhythm; No murmurs, rubs, or gallops  GI: Soft, Nontender, Nondistended; Bowel sounds present  EXTREMITIES:  2+ Peripheral Pulses, No clubbing, cyanosis, or edema, Sx site dressed/ packed/ compressive ace, less warmth, tender to palpation    Consultant(s) Notes Reviewed:  [x ] YES  [ ] NO    Discussed with Consultants/Other Providers [ x] YES     LABS                          13.3   11.36 )-----------( 223      ( 13 Sep 2019 07:41 )             40.5               PT/INR - ( 13 Sep 2019 11:33 )   PT: 11.80 sec;   INR: 1.03 ratio         PTT - ( 13 Sep 2019 11:33 )  PTT:36.9 sec  Lactate Trend        CAPILLARY BLOOD GLUCOSE            RADIOLOGY & ADDITIONAL TESTS:    Imaging Personally Reviewed:  [ ] YES  [ ] NO    HEALTH ISSUES - PROBLEM Dx:  Gastroesophageal reflux disease, esophagitis presence not specified: Gastroesophageal reflux disease, esophagitis presence not specified  Anxiety and depression: Anxiety and depression  Cellulitis and abscess of foot: Cellulitis and abscess of foot

## 2019-09-14 NOTE — PROGRESS NOTE ADULT - SUBJECTIVE AND OBJECTIVE BOX
PODIATRY PROGRESS NOTE   [id number] GILMA CROWDER is a pleasant well-nourished, well developed 53y Female in no acute distress, alert awake, and oriented to person, place and time.   Patient is a 53y old  Female who presents with a chief complaint of infected toe (14 Sep 2019 11:33)    HPI:  52 yo female sent in by [facility] [place] L second toe. Pt states there was a "pimple" there that she popped 4 weeks ago - clear fluid drained but it reformed. She then squeezed it again 3 days ago and got more clear fluid. 1 day later she began having pain, redness and swelling. Admits to chills, but hasnt taken her temp.  She states she can not put any pressure on her foot and had a fall last night while trying to climb the stairs because of it. Pt states she hit the left side of her face on the banister.    Denies LOC, dizziness, HA or visual changes. (12 Sep 2019 16:08)    pt seen and assessed am rounds at bedside.  dressing clean dry intact.  throbbing pain as per pt.  s/p I&D left foot 9/13    Vital Signs Last 24 Hrs  T(C): 37.1 (14 Sep 2019 05:00), Max: 37.1 (13 Sep 2019 22:07)  T(F): 98.7 (14 Sep 2019 05:00), Max: 98.7 (13 Sep 2019 22:07)  HR: 56 (14 Sep 2019 05:00) (56 - 83)  BP: 100/50 (14 Sep 2019 05:00) (88/42 - 124/56)  BP(mean): --  RR: 18 (14 Sep 2019 05:00) (16 - 18)  SpO2: 99% (13 Sep 2019 18:25) (95% - 99%)                        13.3   11.36 )-----------( 223      ( 13 Sep 2019 07:41 )             40.5                 O:   Derm: granular surgical wound base with mild bleeding noted.   Vascular: Dorsalis Pedis and Posterior Tibial pulses 2/4.  Capillary re-fill time less then 3 seconds digits 1-5 left foot, warm to to touch  Neuro: Protective sensation intact to the level of the digits left foot.  MSK: HAV Left, pain palpation         Assessment and Plan:   left 2nd digit cellulitis  possible abscess  s/p I&D left foot 9/13    Chart reviewed and Patient evaluated  Discussed diagnosis and treatment with patient  xray reviewed  ID consult appreciated  Continue IV abx as per ID  washed with NS, apply xeroform dsd kerlix/ace left foot  local wound care; wash with NS, apply xeroform dsd kerlix ace left foot q24h  req visiting nurse q24h  f/u MRI to r/o OM, if negative pt to f/u o/p  f/u surgical wound culture  WBAT darco on left foot  f/u o/p 1 week 242 tea wiggins ernestine 3 with Dr. Pepper  f/u w attending  09-14-19 @ 14:01

## 2019-09-15 LAB
ALBUMIN SERPL ELPH-MCNC: 4.3 G/DL — SIGNIFICANT CHANGE UP (ref 3.5–5.2)
ALP SERPL-CCNC: 78 U/L — SIGNIFICANT CHANGE UP (ref 30–115)
ALT FLD-CCNC: 9 U/L — SIGNIFICANT CHANGE UP (ref 0–41)
ANION GAP SERPL CALC-SCNC: 9 MMOL/L — SIGNIFICANT CHANGE UP (ref 7–14)
AST SERPL-CCNC: 16 U/L — SIGNIFICANT CHANGE UP (ref 0–41)
BILIRUB SERPL-MCNC: 0.4 MG/DL — SIGNIFICANT CHANGE UP (ref 0.2–1.2)
BUN SERPL-MCNC: 13 MG/DL — SIGNIFICANT CHANGE UP (ref 10–20)
CALCIUM SERPL-MCNC: 9.5 MG/DL — SIGNIFICANT CHANGE UP (ref 8.5–10.1)
CHLORIDE SERPL-SCNC: 101 MMOL/L — SIGNIFICANT CHANGE UP (ref 98–110)
CO2 SERPL-SCNC: 31 MMOL/L — SIGNIFICANT CHANGE UP (ref 17–32)
CREAT SERPL-MCNC: 0.6 MG/DL — LOW (ref 0.7–1.5)
GLUCOSE SERPL-MCNC: 94 MG/DL — SIGNIFICANT CHANGE UP (ref 70–99)
HCT VFR BLD CALC: 40.1 % — SIGNIFICANT CHANGE UP (ref 37–47)
HGB BLD-MCNC: 13.2 G/DL — SIGNIFICANT CHANGE UP (ref 12–16)
MAGNESIUM SERPL-MCNC: 2 MG/DL — SIGNIFICANT CHANGE UP (ref 1.8–2.4)
MCHC RBC-ENTMCNC: 32.8 PG — HIGH (ref 27–31)
MCHC RBC-ENTMCNC: 32.9 G/DL — SIGNIFICANT CHANGE UP (ref 32–37)
MCV RBC AUTO: 99.5 FL — HIGH (ref 81–99)
NRBC # BLD: 0 /100 WBCS — SIGNIFICANT CHANGE UP (ref 0–0)
PLATELET # BLD AUTO: 268 K/UL — SIGNIFICANT CHANGE UP (ref 130–400)
POTASSIUM SERPL-MCNC: 4.2 MMOL/L — SIGNIFICANT CHANGE UP (ref 3.5–5)
POTASSIUM SERPL-SCNC: 4.2 MMOL/L — SIGNIFICANT CHANGE UP (ref 3.5–5)
PROT SERPL-MCNC: 7.2 G/DL — SIGNIFICANT CHANGE UP (ref 6–8)
RBC # BLD: 4.03 M/UL — LOW (ref 4.2–5.4)
RBC # FLD: 12.9 % — SIGNIFICANT CHANGE UP (ref 11.5–14.5)
SODIUM SERPL-SCNC: 141 MMOL/L — SIGNIFICANT CHANGE UP (ref 135–146)
VANCOMYCIN TROUGH SERPL-MCNC: 29.6 UG/ML — HIGH (ref 5–10)
WBC # BLD: 7.44 K/UL — SIGNIFICANT CHANGE UP (ref 4.8–10.8)
WBC # FLD AUTO: 7.44 K/UL — SIGNIFICANT CHANGE UP (ref 4.8–10.8)

## 2019-09-15 PROCEDURE — 99233 SBSQ HOSP IP/OBS HIGH 50: CPT

## 2019-09-15 RX ADMIN — MORPHINE SULFATE 4 MILLIGRAM(S): 50 CAPSULE, EXTENDED RELEASE ORAL at 15:12

## 2019-09-15 RX ADMIN — PANTOPRAZOLE SODIUM 40 MILLIGRAM(S): 20 TABLET, DELAYED RELEASE ORAL at 06:28

## 2019-09-15 RX ADMIN — MORPHINE SULFATE 4 MILLIGRAM(S): 50 CAPSULE, EXTENDED RELEASE ORAL at 06:29

## 2019-09-15 RX ADMIN — Medication 250 MILLIGRAM(S): at 06:27

## 2019-09-15 RX ADMIN — Medication 1 DROP(S): at 06:28

## 2019-09-15 RX ADMIN — SENNA PLUS 2 TABLET(S): 8.6 TABLET ORAL at 21:56

## 2019-09-15 RX ADMIN — MORPHINE SULFATE 4 MILLIGRAM(S): 50 CAPSULE, EXTENDED RELEASE ORAL at 10:29

## 2019-09-15 RX ADMIN — MORPHINE SULFATE 2 MILLIGRAM(S): 50 CAPSULE, EXTENDED RELEASE ORAL at 15:56

## 2019-09-15 RX ADMIN — ENOXAPARIN SODIUM 40 MILLIGRAM(S): 100 INJECTION SUBCUTANEOUS at 13:11

## 2019-09-15 RX ADMIN — Medication 5 MILLIGRAM(S): at 15:56

## 2019-09-15 RX ADMIN — SERTRALINE 100 MILLIGRAM(S): 25 TABLET, FILM COATED ORAL at 13:11

## 2019-09-15 RX ADMIN — Medication 100 MILLIGRAM(S): at 21:56

## 2019-09-15 RX ADMIN — MORPHINE SULFATE 4 MILLIGRAM(S): 50 CAPSULE, EXTENDED RELEASE ORAL at 10:44

## 2019-09-15 RX ADMIN — MORPHINE SULFATE 4 MILLIGRAM(S): 50 CAPSULE, EXTENDED RELEASE ORAL at 14:57

## 2019-09-15 RX ADMIN — Medication 250 MILLIGRAM(S): at 17:58

## 2019-09-15 RX ADMIN — Medication 100 MILLIGRAM(S): at 06:28

## 2019-09-15 RX ADMIN — MORPHINE SULFATE 4 MILLIGRAM(S): 50 CAPSULE, EXTENDED RELEASE ORAL at 22:56

## 2019-09-15 RX ADMIN — MORPHINE SULFATE 4 MILLIGRAM(S): 50 CAPSULE, EXTENDED RELEASE ORAL at 07:03

## 2019-09-15 RX ADMIN — MORPHINE SULFATE 2 MILLIGRAM(S): 50 CAPSULE, EXTENDED RELEASE ORAL at 16:14

## 2019-09-15 RX ADMIN — MORPHINE SULFATE 4 MILLIGRAM(S): 50 CAPSULE, EXTENDED RELEASE ORAL at 21:56

## 2019-09-15 NOTE — PROGRESS NOTE ADULT - SUBJECTIVE AND OBJECTIVE BOX
GILMA CROWDER  53y  Female      Patient is a 53y old  Female who presents with a chief complaint of infected toe (14 Sep 2019 14:01)      INTERVAL HPI/OVERNIGHT EVENTS: c/o toe pain. not walking yet      REVIEW OF SYSTEMS:  as above  All other review of systems negative    T(C): 36.4 (09-15-19 @ 05:00), Max: 37.6 (09-14-19 @ 21:39)  HR: 66 (09-15-19 @ 05:00) (55 - 66)  BP: 135/60 (09-15-19 @ 05:00) (107/49 - 135/60)  RR: 16 (09-15-19 @ 05:00) (16 - 18)  SpO2: --  Wt(kg): --Vital Signs Last 24 Hrs  T(C): 36.4 (15 Sep 2019 05:00), Max: 37.6 (14 Sep 2019 21:39)  T(F): 97.5 (15 Sep 2019 05:00), Max: 99.6 (14 Sep 2019 21:39)  HR: 66 (15 Sep 2019 05:00) (55 - 66)  BP: 135/60 (15 Sep 2019 05:00) (107/49 - 135/60)  BP(mean): --  RR: 16 (15 Sep 2019 05:00) (16 - 18)  SpO2: --        PHYSICAL EXAM:  GENERAL: NAD thin  PSYCH: no agitation, baseline mentation  NERVOUS SYSTEM:  Alert & Oriented X3, no new focal deficits  PULMONARY: Clear to percussion bilaterally; No rales, rhonchi, wheezing, or rubs  CARDIOVASCULAR: Regular rate and rhythm; No murmurs, rubs, or gallops  GI: Soft, Nontender, Nondistended; Bowel sounds present  EXTREMITIES:  2+ Peripheral Pulses, No clubbing, cyanosis, or edema, L foot surgical dressing, packed, compressive ace, tender    Consultant(s) Notes Reviewed:  [x ] YES  [ ] NO    Discussed with Consultants/Other Providers [ x] YES     LABS                          13.2   7.44  )-----------( 268      ( 15 Sep 2019 06:00 )             40.1     09-15    141  |  101  |  13  ----------------------------<  94  4.2   |  31  |  0.6<L>    Ca    9.5      15 Sep 2019 06:00  Mg     2.0     09-15    TPro  7.2  /  Alb  4.3  /  TBili  0.4  /  DBili  x   /  AST  16  /  ALT  9   /  AlkPhos  78  09-15          Lactate Trend        CAPILLARY BLOOD GLUCOSE            RADIOLOGY & ADDITIONAL TESTS:    Imaging Personally Reviewed:  [ ] YES  [ ] NO    HEALTH ISSUES - PROBLEM Dx:  Gastroesophageal reflux disease, esophagitis presence not specified: Gastroesophageal reflux disease, esophagitis presence not specified  Anxiety and depression: Anxiety and depression  Cellulitis and abscess of foot: Cellulitis and abscess of foot          #Progress Note Handoff    Pending (specify):  Consults_________, Tests________, Test Results_______, Other_________    Family discussion:    Disposition: Home___/SNF___/Other________/Unknown at this time________

## 2019-09-15 NOTE — PROGRESS NOTE ADULT - ASSESSMENT
54yo f c PMHx anxiety/depression here with infected L foot 2nd digit, purulent cellulitis    s/p I+D, pending wound cx results, c/w iv abx for now  prelim cx growing Staph aureus, sensitivities pending  analgesia  bowel regimen  pending podiatry post op wound care checks if no need for further debridements and set up for wound care  WBAT darco on left foot  local wound care; wash with NS, apply xeroform dsd kerlix ace left foot q24h  will need to f/u with ID again in light of MRI findings-  Second DIP dorsal ulcer, nodular nailbed soft tissue   swelling, second DIP joint effusion and erosion. Findings are consistent   with septic arthritis.     monitor K, Cr if transitioned to bactrim  will likely need PT/ gait training sessions when cleared for heel touch for cane training and appropriate offloading techniques  as outpatient         #Progress Note Handoff    Pending (specify):  Consults_________, Tests________, Test Results_______, Other_____post op wound care, on iv abx____    Family discussion: plan of care discussed with patient    Disposition: Home with home wound care

## 2019-09-16 PROCEDURE — 99233 SBSQ HOSP IP/OBS HIGH 50: CPT

## 2019-09-16 PROCEDURE — 36573 INSJ PICC RS&I 5 YR+: CPT

## 2019-09-16 RX ORDER — CEFAZOLIN SODIUM 1 G
2000 VIAL (EA) INJECTION EVERY 8 HOURS
Refills: 0 | Status: DISCONTINUED | OUTPATIENT
Start: 2019-09-16 | End: 2019-09-18

## 2019-09-16 RX ORDER — CEFAZOLIN SODIUM 1 G
1000 VIAL (EA) INJECTION EVERY 8 HOURS
Refills: 0 | Status: DISCONTINUED | OUTPATIENT
Start: 2019-09-16 | End: 2019-09-16

## 2019-09-16 RX ORDER — IBUPROFEN 200 MG
400 TABLET ORAL EVERY 6 HOURS
Refills: 0 | Status: DISCONTINUED | OUTPATIENT
Start: 2019-09-16 | End: 2019-09-18

## 2019-09-16 RX ORDER — BENZOCAINE AND MENTHOL 5; 1 G/100ML; G/100ML
1 LIQUID ORAL
Refills: 0 | Status: DISCONTINUED | OUTPATIENT
Start: 2019-09-16 | End: 2019-09-18

## 2019-09-16 RX ADMIN — Medication 250 MILLIGRAM(S): at 06:28

## 2019-09-16 RX ADMIN — PANTOPRAZOLE SODIUM 40 MILLIGRAM(S): 20 TABLET, DELAYED RELEASE ORAL at 06:29

## 2019-09-16 RX ADMIN — MORPHINE SULFATE 4 MILLIGRAM(S): 50 CAPSULE, EXTENDED RELEASE ORAL at 22:20

## 2019-09-16 RX ADMIN — Medication 100 MILLIGRAM(S): at 21:41

## 2019-09-16 RX ADMIN — MORPHINE SULFATE 4 MILLIGRAM(S): 50 CAPSULE, EXTENDED RELEASE ORAL at 21:44

## 2019-09-16 RX ADMIN — SENNA PLUS 2 TABLET(S): 8.6 TABLET ORAL at 21:41

## 2019-09-16 RX ADMIN — Medication 100 MILLIGRAM(S): at 11:03

## 2019-09-16 RX ADMIN — MORPHINE SULFATE 4 MILLIGRAM(S): 50 CAPSULE, EXTENDED RELEASE ORAL at 12:55

## 2019-09-16 RX ADMIN — MORPHINE SULFATE 2 MILLIGRAM(S): 50 CAPSULE, EXTENDED RELEASE ORAL at 14:35

## 2019-09-16 RX ADMIN — Medication 100 MILLIGRAM(S): at 06:29

## 2019-09-16 RX ADMIN — MORPHINE SULFATE 4 MILLIGRAM(S): 50 CAPSULE, EXTENDED RELEASE ORAL at 11:02

## 2019-09-16 RX ADMIN — BENZOCAINE AND MENTHOL 1 LOZENGE: 5; 1 LIQUID ORAL at 23:26

## 2019-09-16 RX ADMIN — MORPHINE SULFATE 2 MILLIGRAM(S): 50 CAPSULE, EXTENDED RELEASE ORAL at 14:57

## 2019-09-16 RX ADMIN — SERTRALINE 100 MILLIGRAM(S): 25 TABLET, FILM COATED ORAL at 11:03

## 2019-09-16 RX ADMIN — Medication 100 MILLIGRAM(S): at 21:43

## 2019-09-16 RX ADMIN — Medication 1 DROP(S): at 06:28

## 2019-09-16 RX ADMIN — Medication 100 MILLIGRAM(S): at 14:33

## 2019-09-16 RX ADMIN — Medication 400 MILLIGRAM(S): at 22:15

## 2019-09-16 RX ADMIN — ENOXAPARIN SODIUM 40 MILLIGRAM(S): 100 INJECTION SUBCUTANEOUS at 11:03

## 2019-09-16 RX ADMIN — Medication 400 MILLIGRAM(S): at 21:44

## 2019-09-16 NOTE — PROGRESS NOTE ADULT - ASSESSMENT
PROBLEMS:    1. Cellulitis and abscess of foot. Recommendation: acute illness  S/P I & d     MRI with Second DIP dorsal ulcer, nodular nailbed soft tissue   swelling, second DIP joint effusion and erosion. Findings are consistent   with septic arthritis. Underlying gout/pseudogout not excluded. Recommend   bandage removal with dedicated second toe x-ray    Hallux metatarsal head well defined bony erosions with severe sesamoidal   hallucal degenerative change. Nonspecific findings which can be seen in   posttraumatic osteoarthritis, neuropathic degenerative change/mixed   crystal deposition disease (gout)    Need input from Podiatry    Continue Ancef for now  Uric acid

## 2019-09-16 NOTE — PROGRESS NOTE ADULT - ASSESSMENT
52yo f c PMHx anxiety/depression here with infected L foot 2nd digit, purulent cellulitis- deep infection with cautery    cx growing YAW  d/w ID, and podiatrist- transitioning to IV ancef 2g q8hr, goal x 6 week of therapy, home PICC line pending  analgesia  bowel regimen  WBAT darco on left foot  local wound care; wash with NS, apply xeroform dsd kerlix ace left foot q24h  MRI findings-  Second DIP dorsal ulcer, nodular nailbed soft tissue   swelling, second DIP joint effusion and erosion  patient does not CLINICALLY appear to have septic arthritis with palpable joint effusion, will treat with prolonged antibiotic IV after discussion with podiatry and ID- all in agreement with plan    will likely need PT/ gait training sessions - heel touch for cane training and appropriate offloading techniques          #Progress Note Handoff    Pending (specify):  Consults_________, Tests________, Test Results_______, Other_____post op wound care, on iv abx____    Family discussion: plan of care discussed with patient    Disposition: Home with home wound care 54yo f c PMHx anxiety/depression here with infected L foot 2nd digit, purulent cellulitis- deep infection with cautery    cx growing YAW  d/w ID, and podiatrist- transitioning to IV ancef 2g q8hr, goal x 6 week of therapy, home PICC line pending  analgesia  bowel regimen  WBAT darco on left foot  local wound care; wash with NS, apply xeroform dsd kerlix ace left foot q24h  MRI findings-  Second DIP dorsal ulcer, nodular nailbed soft tissue   swelling, second DIP joint effusion and erosion  patient does not CLINICALLY appear to have septic arthritis with palpable joint effusion, will treat with prolonged antibiotic IV after discussion with podiatry and ID- all in agreement with plan    no evidence of bacteremia- proceed with picc placement    will likely need PT/ gait training sessions - heel touch for cane training and appropriate offloading techniques          #Progress Note Handoff    Pending (specify):  Consults_________, Tests________, Test Results_______, Other_____post op wound care, on iv abx____    Family discussion: plan of care discussed with patient    Disposition: Home with home wound care

## 2019-09-16 NOTE — PROGRESS NOTE ADULT - SUBJECTIVE AND OBJECTIVE BOX
GILMA CROWDER  53y  Female      Patient is a 53y old  Female who presents with a chief complaint of infected toe (16 Sep 2019 09:11)      INTERVAL HPI/OVERNIGHT EVENTS: c/o ongoing pain at surgical site. has not walked much      REVIEW OF SYSTEMS:  as above  All other review of systems negative    T(C): 36.2 (09-16-19 @ 14:02), Max: 36.8 (09-15-19 @ 21:24)  HR: 62 (09-16-19 @ 14:02) (54 - 65)  BP: 112/57 (09-16-19 @ 14:02) (95/55 - 120/55)  RR: 16 (09-16-19 @ 14:02) (16 - 18)  SpO2: --  Wt(kg): --Vital Signs Last 24 Hrs  T(C): 36.2 (16 Sep 2019 14:02), Max: 36.8 (15 Sep 2019 21:24)  T(F): 97.1 (16 Sep 2019 14:02), Max: 98.3 (15 Sep 2019 21:24)  HR: 62 (16 Sep 2019 14:02) (54 - 65)  BP: 112/57 (16 Sep 2019 14:02) (95/55 - 120/55)  BP(mean): --  RR: 16 (16 Sep 2019 14:02) (16 - 18)  SpO2: --        PHYSICAL EXAM:  GENERAL: NAD  PSYCH: no agitation, baseline mentation  NERVOUS SYSTEM:  Alert & Oriented X3, no new focal deficits  PULMONARY: Clear to percussion bilaterally; No rales, rhonchi, wheezing, or rubs  CARDIOVASCULAR: Regular rate and rhythm; No murmurs, rubs, or gallops  GI: Soft, Nontender, Nondistended; Bowel sounds present  EXTREMITIES:  granular surgical base, dressed/ packed, compressive bandage, tender to palpation, less erythema/warmth, no expressible pus    Consultant(s) Notes Reviewed:  [x ] YES  [ ] NO    Discussed with Consultants/Other Providers [ x] YES     LABS                          13.2   7.44  )-----------( 268      ( 15 Sep 2019 06:00 )             40.1     09-15    141  |  101  |  13  ----------------------------<  94  4.2   |  31  |  0.6<L>    Ca    9.5      15 Sep 2019 06:00  Mg     2.0     09-15    TPro  7.2  /  Alb  4.3  /  TBili  0.4  /  DBili  x   /  AST  16  /  ALT  9   /  AlkPhos  78  09-15          Lactate Trend        CAPILLARY BLOOD GLUCOSE            RADIOLOGY & ADDITIONAL TESTS:    Imaging Personally Reviewed:  [ ] YES  [ ] NO    HEALTH ISSUES - PROBLEM Dx:  Gastroesophageal reflux disease, esophagitis presence not specified: Gastroesophageal reflux disease, esophagitis presence not specified  Anxiety and depression: Anxiety and depression  Cellulitis and abscess of foot: Cellulitis and abscess of foot          #Progress Note Handoff    Pending (specify):  Consults_________, Tests________, Test Results_______, Other_________    Family discussion:    Disposition: Home___/SNF___/Other________/Unknown at this time________

## 2019-09-16 NOTE — PHYSICAL THERAPY INITIAL EVALUATION ADULT - GAIT DEVIATIONS NOTED, PT EVAL
decreased step length/decreased weight-shifting ability/decreased fallon/increased time in double stance

## 2019-09-16 NOTE — PROGRESS NOTE ADULT - SUBJECTIVE AND OBJECTIVE BOX
PODIATRY PROGRESS NOTE   [id number] GILMA CROWDER is a pleasant well-nourished, well developed 53y Female in no acute distress, alert awake, and oriented to person, place and time.   Patient is a 53y old  Female who presents with a chief complaint of infected toe (16 Sep 2019 08:30)    HPI:  52 yo female sent in by [facility] for infected L second toe. Pt states there was a "pimple" there that she popped 4 weeks ago - clear fluid drained but it reformed. She then squeezed it again 3 days ago and got more clear fluid. 1 day later she began having pain, redness and swelling. Admits to chills, but hasnt taken her temp.  She states she can not put any pressure on her foot and had a fall last night while trying to climb the stairs because of it. Pt states she hit the left side of her face on the banister.    Denies LOC, dizziness, HA or visual changes. (12 Sep 2019 16:08)    pt seen and assessed am rounds.  pt in a lot of pain surgical site.  dressing clean dry intact.    Vital Signs Last 24 Hrs  T(C): 35.6 (16 Sep 2019 05:00), Max: 36.8 (15 Sep 2019 21:24)  T(F): 96 (16 Sep 2019 05:00), Max: 98.3 (15 Sep 2019 21:24)  HR: 54 (16 Sep 2019 05:00) (54 - 65)  BP: 95/55 (16 Sep 2019 05:00) (95/55 - 129/49)  BP(mean): --  RR: 18 (16 Sep 2019 05:00) (16 - 18)  SpO2: --                        13.2   7.44  )-----------( 268      ( 15 Sep 2019 06:00 )             40.1                 09-15    141  |  101  |  13  ----------------------------<  94  4.2   |  31  |  0.6<L>    Ca    9.5      15 Sep 2019 06:00  Mg     2.0     09-15    TPro  7.2  /  Alb  4.3  /  TBili  0.4  /  DBili  x   /  AST  16  /  ALT  9   /  AlkPhos  78  09-15      < from: MR Foot No Cont, Left (09.14.19 @ 14:19) >    EXAM:  MR FOOT LT            PROCEDURE DATE:  09/14/2019            INTERPRETATION:  Clinical History / Reason for exam: Third digit   osteomyelitis    TECHNIQUE: Multiplanar multi sequential water and fat-weighted sequences   are obtained through the left forefoot    Patient moving throughout all pulse sequences with best obtainable    Comparison left foot x-ray 9/12/2019    FINDINGS: Nodular soft tissue swelling present at the dorsal aspect of   second DIP joint with bone marrow edema. There is apparent eccentric and   intra-articular erosion of the second DIP joint. Joint effusion present.   (Series 6 image 18)    There are multiple well-defined erosions at the hallux metatarsal head   with severe local sesamoidal degenerative change andjoint incongruency.   (Series 2 image 19-21).  Hammertoe deformities present at the PIP joints.    Muscular compartment demonstrates no neuritis pattern. There is dorsal   lateral subcutaneous soft tissue swelling.  Tarsometatarsal alignment is   maintained.    Distal second flexor and extensor tendons demonstrate mild localized   tenosynovitis.    Plantar capsules intact with capsular thickening along the second and   third MTP joints There is second and third interspace neuroma (series 2   image 15.)    IMPRESSION: Second DIP dorsal ulcer, nodular nailbed soft tissue   swelling, second DIP joint effusion and erosion. Findings are consistent   with septic arthritis. Underlying gout/pseudogout not excluded. Recommend   bandage removal with dedicated second toe x-ray    Hallux metatarsal head well defined bony erosions with severe sesamoidal   hallucal degenerative change. Nonspecific findings which can be seen in   posttraumatic osteoarthritis, neuropathic degenerative change/mixed   crystal deposition disease (gout)    Recommend laboratory correlation/screening x-ray of right foot                             ENRICO DEAL M.D., ATTENDING RADIOLOGIST  This document has been electronically signed. Sep 14 2019  4:32PM                < end of copied text >    Derm: granular surgical wound base, improving edema/erythema   Vascular: Dorsalis Pedis and Posterior Tibial pulses 2/4.  Capillary re-fill time less then 3 seconds digits 1-5 left foot, warm to to touch  Neuro: Protective sensation intact to the level of the digits left foot.  MSK: HAV Left, pain palpation         Assessment and Plan:   left 2nd digit cellulitis  possible abscess  s/p I&D left foot 9/13    Chart reviewed and Patient evaluated  Discussed diagnosis and treatment with patient and medicine attending.  pt stable to d/c from podiatry standpoint  xray reviewed  ID consult appreciated  Continue IV abx as per ID, ancef  washed with NS, apply xeroform dsd kerlix/ace left foot  local wound care; wash with NS, apply xeroform dsd kerlix ace left foot q24h  req visiting nurse q24h  MRI as above  f/u surgical wound culture: staph aureus  WBAT darco on left foot  no further sx intervention from podiatry  req pain management  f/u with ID for abx regimen  f/u o/p 1 week 242 tea sinha 3 with Dr. Pepper  f/u w attending    09-16-19 @ 09:11 PODIATRY PROGRESS NOTE   [id number] GILMA CROWDER is a pleasant well-nourished, well developed 53y Female in no acute distress, alert awake, and oriented to person, place and time.   Patient is a 53y old  Female who presents with a chief complaint of infected toe (16 Sep 2019 08:30)    HPI:  52 yo female sent in by [facility] [place] for infected L second toe. Pt states there was a "pimple" there that she popped 4 weeks ago - clear fluid drained but it reformed. She then squeezed it again 3 days ago and got more clear fluid. 1 day later she began having pain, redness and swelling. Admits to chills, but hasnt taken her temp.  She states she can not put any pressure on her foot and had a fall last night while trying to climb the stairs because of it. Pt states she hit the left side of her face on the banister.    Denies LOC, dizziness, HA or visual changes. (12 Sep 2019 16:08)    pt seen and assessed am rounds.  pt in a lot of pain surgical site.  dressing clean dry intact.    Vital Signs Last 24 Hrs  T(C): 35.6 (16 Sep 2019 05:00), Max: 36.8 (15 Sep 2019 21:24)  T(F): 96 (16 Sep 2019 05:00), Max: 98.3 (15 Sep 2019 21:24)  HR: 54 (16 Sep 2019 05:00) (54 - 65)  BP: 95/55 (16 Sep 2019 05:00) (95/55 - 129/49)  BP(mean): --  RR: 18 (16 Sep 2019 05:00) (16 - 18)  SpO2: --                        13.2   7.44  )-----------( 268      ( 15 Sep 2019 06:00 )             40.1                 09-15    141  |  101  |  13  ----------------------------<  94  4.2   |  31  |  0.6<L>    Ca    9.5      15 Sep 2019 06:00  Mg     2.0     09-15    TPro  7.2  /  Alb  4.3  /  TBili  0.4  /  DBili  x   /  AST  16  /  ALT  9   /  AlkPhos  78  09-15      < from: MR Foot No Cont, Left (09.14.19 @ 14:19) >    EXAM:  MR FOOT LT            PROCEDURE DATE:  09/14/2019            INTERPRETATION:  Clinical History / Reason for exam: Third digit   osteomyelitis    TECHNIQUE: Multiplanar multi sequential water and fat-weighted sequences   are obtained through the left forefoot    Patient moving throughout all pulse sequences with best obtainable    Comparison left foot x-ray 9/12/2019    FINDINGS: Nodular soft tissue swelling present at the dorsal aspect of   second DIP joint with bone marrow edema. There is apparent eccentric and   intra-articular erosion of the second DIP joint. Joint effusion present.   (Series 6 image 18)    There are multiple well-defined erosions at the hallux metatarsal head   with severe local sesamoidal degenerative change andjoint incongruency.   (Series 2 image 19-21).  Hammertoe deformities present at the PIP joints.    Muscular compartment demonstrates no neuritis pattern. There is dorsal   lateral subcutaneous soft tissue swelling.  Tarsometatarsal alignment is   maintained.    Distal second flexor and extensor tendons demonstrate mild localized   tenosynovitis.    Plantar capsules intact with capsular thickening along the second and   third MTP joints There is second and third interspace neuroma (series 2   image 15.)    IMPRESSION: Second DIP dorsal ulcer, nodular nailbed soft tissue   swelling, second DIP joint effusion and erosion. Findings are consistent   with septic arthritis. Underlying gout/pseudogout not excluded. Recommend   bandage removal with dedicated second toe x-ray    Hallux metatarsal head well defined bony erosions with severe sesamoidal   hallucal degenerative change. Nonspecific findings which can be seen in   posttraumatic osteoarthritis, neuropathic degenerative change/mixed   crystal deposition disease (gout)    Recommend laboratory correlation/screening x-ray of right foot                             ENRICO DEAL M.D., ATTENDING RADIOLOGIST  This document has been electronically signed. Sep 14 2019  4:32PM                < end of copied text >    Derm: granular surgical wound base, improving edema/erythema   Vascular: Dorsalis Pedis and Posterior Tibial pulses 2/4.  Capillary re-fill time less then 3 seconds digits 1-5 left foot, warm to to touch  Neuro: Protective sensation intact to the level of the digits left foot.  MSK: HAV Left, pain palpation         Assessment and Plan:   left 2nd digit cellulitis  possible abscess  s/p I&D left foot 9/13    Chart reviewed and Patient evaluated  Discussed diagnosis and treatment with patient and medicine attending.  pt stable to d/c from podiatry standpoint  xray reviewed  ID consult appreciated  Continue IV abx as per ID, ancef  washed with NS, apply xeroform dsd kerlix/ace left foot  local wound care; wash with NS, apply xeroform dsd kerlix ace left foot q24h  req visiting nurse q24h  MRI as above  f/u surgical wound culture: staph aureus  WBAT darco on left foot  no further sx intervention from podiatry  req pain management  req PT for gait training  f/u with ID for abx regimen  f/u o/p 1 week 242 tea wiggins ernestine 3 with Dr. Pepper  f/u w attending    09-16-19 @ 09:11

## 2019-09-16 NOTE — PROCEDURE NOTE - NSPOSTCAREGUIDE_GEN_A_CORE
Instructed patient/caregiver regarding signs and symptoms of infection/Verbal/written post procedure instructions were given to patient/caregiver/Care for catheter as per unit/ICU protocols/Instructed patient/caregiver to follow-up with primary care physician/Keep the cast/splint/dressing clean and dry

## 2019-09-16 NOTE — PROGRESS NOTE ADULT - SUBJECTIVE AND OBJECTIVE BOX
VEL GILMA  53y, Female  Allergy: No Known Allergies      CHIEF COMPLAINT: infected toe (15 Sep 2019 11:35)      INTERVAL EVENTS/HPI  - No acute events overnight  - T(F): , Max: 98.3 (09-15-19 @ 21:24)  - Denies any worsening symptoms  - Tolerating medication  -     ROS  General: Denies fevers, chills, nightsweats, weight loss  HEENT: Denies headache, rhinorrhea, sore throat, eye pain  CV: Denies CP, palpitations  PULM: Denies SOB, cough  GI: Denies abdominal pain, diarrhea  : Denies dysuria, hematuria  MSK: Denies arthralgias  SKIN: Denies rash   NEURO: Denies paresthesias, weakness  PSYCH: Denies depression    FH non-contributory   Social Hx non-contributory    VITALS:  T(F): 96, Max: 98.3 (09-15-19 @ 21:24)  HR: 54  BP: 95/55  RR: 18Vital Signs Last 24 Hrs  T(C): 35.6 (16 Sep 2019 05:00), Max: 36.8 (15 Sep 2019 21:24)  T(F): 96 (16 Sep 2019 05:00), Max: 98.3 (15 Sep 2019 21:24)  HR: 54 (16 Sep 2019 05:00) (54 - 65)  BP: 95/55 (16 Sep 2019 05:00) (95/55 - 129/49)  BP(mean): --  RR: 18 (16 Sep 2019 05:00) (16 - 18)  SpO2: --    PHYSICAL EXAM:  Gen: NAD, resting in bed  HEENT: Normocephalic, atraumatic  Neck: supple, no lymphadenopathy  CV: Regular rate & regular rhythm  Lungs: decreased BS at bases, no fremitus  Abdomen: Soft, BS present  Ext: Warm, well perfused  Neuro: non focal, awake  Skin: no rash, no erythema      TESTS & MEASUREMENTS:                        13.2   7.44  )-----------( 268      ( 15 Sep 2019 06:00 )             40.1     09-15    141  |  101  |  13  ----------------------------<  94  4.2   |  31  |  0.6<L>    Ca    9.5      15 Sep 2019 06:00  Mg     2.0     09-15    TPro  7.2  /  Alb  4.3  /  TBili  0.4  /  DBili  x   /  AST  16  /  ALT  9   /  AlkPhos  78  09-15      LIVER FUNCTIONS - ( 15 Sep 2019 06:00 )  Alb: 4.3 g/dL / Pro: 7.2 g/dL / ALK PHOS: 78 U/L / ALT: 9 U/L / AST: 16 U/L / GGT: x               Culture - Other (collected 09-13-19 @ 17:40)  Source: Wound abscess left foot  Final Report (09-15-19 @ 18:36):    Few Staphylococcus aureus  Organism: Staphylococcus aureus (09-15-19 @ 18:36)  Organism: Staphylococcus aureus (09-15-19 @ 18:36)      -  Ampicillin/Sulbactam: S <=8/4      -  Cefazolin: S <=4      -  Clindamycin: S <=0.25      -  Erythromycin: S 0.5      -  Gentamicin: S <=1 Should not be used as monotherapy      -  Oxacillin: S <=0.25      -  Penicillin: R 8      -  RIF- Rifampin: S <=1 Should not be used as monotherapy      -  Tetra/Doxy: S <=1      -  Trimethoprim/Sulfamethoxazole: S <=0.5/9.5      -  Vancomycin: S 2      Method Type: RON    Culture - Blood (collected 09-12-19 @ 19:17)  Source: .Blood None  Preliminary Report (09-14-19 @ 03:01):    No growth to date.    Culture - Blood (collected 09-12-19 @ 19:17)  Source: .Blood None  Preliminary Report (09-14-19 @ 03:01):    No growth to date.            INFECTIOUS DISEASES TESTING  Hepatitis C Virus Interpretation: Nonreact (09-13-19 @ 07:41)  MRSA PCR Result.: Negative (09-12-19 @ 18:18)      RADIOLOGY & ADDITIONAL TESTS:  I have personally reviewed the last Chest xray  CXR      CT      CARDIOLOGY TESTING  12 Lead ECG:   Ventricular Rate 58 BPM    Atrial Rate 58 BPM    P-R Interval 156 ms    QRS Duration 74 ms    Q-T Interval 418 ms    QTC Calculation(Bezet) 410 ms    P Axis 54 degrees    R Axis 80 degrees    T Axis 49 degrees    Diagnosis Line Sinus bradycardia with sinus arrhythmia  Otherwise normal ECG    Confirmed by SUHA LEWIS MD ([id number]) on 9/13/2019 4:27:48 PM (09-13-19 @ 08:40)  12 Lead ECG:   Ventricular Rate 60 BPM    Atrial Rate 60 BPM    P-R Interval 156 ms    QRS Duration 72 ms    Q-T Interval 416 ms    QTC Calculation(Bezet) 416 ms    P Axis 52 degrees    R Axis 80 degrees    T Axis 37 degrees    Diagnosis Line Normal sinus rhythm with sinus arrhythmia  Normal ECG    Confirmed by SUHA LEWIS MD [id number]) on 9/13/2019 4:27:45 PM (09-13-19 @ 08:39)      MEDICATIONS  ceFAZolin   IVPB 1000  docusate sodium 100  enoxaparin Injectable 40  ofloxacin 0.3% Solution 1  pantoprazole    Tablet 40  senna 2  sertraline 100      ANTIBIOTICS:  ceFAZolin   IVPB 1000 milliGRAM(s) IV Intermittent every 8 hours      All available historical data has been reviewed

## 2019-09-16 NOTE — PROCEDURE NOTE - ADDITIONAL PROCEDURE DETAILS
5-Welsh, 34cm single lumen PICC enters from the left upper extremity, with catheter tip in the superior vena cava at the caval-atrial junction.  The catheter works well and is ready to use.

## 2019-09-16 NOTE — PROCEDURE NOTE - NSPROCDETAILS_GEN_ALL_CORE
sterile dressing applied/supine position/location identified, draped/prepped, sterile technique used/sterile technique, catheter placed/ultrasound guidance/ultrasound assessment

## 2019-09-16 NOTE — PROCEDURE NOTE - NSPOSTPRCRAD_GEN_A_CORE
fluoroscopy/ultrasound/chest radiograph/postion of catheter/depth of insertion/line adjusted to depth of insertion/line in appropriate postion

## 2019-09-16 NOTE — PHYSICAL THERAPY INITIAL EVALUATION ADULT - GENERAL OBSERVATIONS, REHAB EVAL
9:02 - 9:22. Chart reviewed. Patient available to be seen for physical therapy. Patient encountered already seated at edge of bed. C/o L foot pain, but is agreeable for PT evaluation.

## 2019-09-17 ENCOUNTER — TRANSCRIPTION ENCOUNTER (OUTPATIENT)
Age: 54
End: 2019-09-17

## 2019-09-17 PROCEDURE — 99233 SBSQ HOSP IP/OBS HIGH 50: CPT

## 2019-09-17 RX ORDER — CEFAZOLIN SODIUM 1 G
2 VIAL (EA) INJECTION
Qty: 0 | Refills: 0 | DISCHARGE
Start: 2019-09-17

## 2019-09-17 RX ORDER — IBUPROFEN 200 MG
1 TABLET ORAL
Qty: 0 | Refills: 0 | DISCHARGE
Start: 2019-09-17

## 2019-09-17 RX ADMIN — SERTRALINE 100 MILLIGRAM(S): 25 TABLET, FILM COATED ORAL at 12:24

## 2019-09-17 RX ADMIN — Medication 100 MILLIGRAM(S): at 06:44

## 2019-09-17 RX ADMIN — MORPHINE SULFATE 2 MILLIGRAM(S): 50 CAPSULE, EXTENDED RELEASE ORAL at 23:10

## 2019-09-17 RX ADMIN — ENOXAPARIN SODIUM 40 MILLIGRAM(S): 100 INJECTION SUBCUTANEOUS at 12:24

## 2019-09-17 RX ADMIN — MORPHINE SULFATE 4 MILLIGRAM(S): 50 CAPSULE, EXTENDED RELEASE ORAL at 20:04

## 2019-09-17 RX ADMIN — MORPHINE SULFATE 2 MILLIGRAM(S): 50 CAPSULE, EXTENDED RELEASE ORAL at 01:01

## 2019-09-17 RX ADMIN — BENZOCAINE AND MENTHOL 1 LOZENGE: 5; 1 LIQUID ORAL at 06:59

## 2019-09-17 RX ADMIN — MORPHINE SULFATE 2 MILLIGRAM(S): 50 CAPSULE, EXTENDED RELEASE ORAL at 10:55

## 2019-09-17 RX ADMIN — SENNA PLUS 2 TABLET(S): 8.6 TABLET ORAL at 22:41

## 2019-09-17 RX ADMIN — Medication 100 MILLIGRAM(S): at 22:41

## 2019-09-17 RX ADMIN — BENZOCAINE AND MENTHOL 1 LOZENGE: 5; 1 LIQUID ORAL at 01:00

## 2019-09-17 RX ADMIN — MORPHINE SULFATE 4 MILLIGRAM(S): 50 CAPSULE, EXTENDED RELEASE ORAL at 15:32

## 2019-09-17 RX ADMIN — MORPHINE SULFATE 2 MILLIGRAM(S): 50 CAPSULE, EXTENDED RELEASE ORAL at 10:18

## 2019-09-17 RX ADMIN — Medication 100 MILLIGRAM(S): at 15:57

## 2019-09-17 RX ADMIN — Medication 100 MILLIGRAM(S): at 15:27

## 2019-09-17 RX ADMIN — MORPHINE SULFATE 4 MILLIGRAM(S): 50 CAPSULE, EXTENDED RELEASE ORAL at 20:34

## 2019-09-17 RX ADMIN — Medication 1 DROP(S): at 06:43

## 2019-09-17 RX ADMIN — MORPHINE SULFATE 4 MILLIGRAM(S): 50 CAPSULE, EXTENDED RELEASE ORAL at 16:32

## 2019-09-17 RX ADMIN — Medication 100 MILLIGRAM(S): at 22:40

## 2019-09-17 RX ADMIN — MORPHINE SULFATE 2 MILLIGRAM(S): 50 CAPSULE, EXTENDED RELEASE ORAL at 01:35

## 2019-09-17 RX ADMIN — PANTOPRAZOLE SODIUM 40 MILLIGRAM(S): 20 TABLET, DELAYED RELEASE ORAL at 06:44

## 2019-09-17 RX ADMIN — MORPHINE SULFATE 2 MILLIGRAM(S): 50 CAPSULE, EXTENDED RELEASE ORAL at 22:51

## 2019-09-17 NOTE — PROGRESS NOTE ADULT - ATTENDING COMMENTS
Patient seen and examined independently of resident. My addendum supersedes resident notation. Case discussed with housestaff, nursing and patient

## 2019-09-17 NOTE — DISCHARGE NOTE PROVIDER - PROVIDER TOKENS
PROVIDER:[TOKEN:[[id number]],FOLLOWUP:[Routine]],PROVIDER:[TOKEN:[[id number]],FOLLOWUP:[2 weeks]] PROVIDER:[TOKEN:[[id number]],FOLLOWUP:[1 week]],PROVIDER:[TOKEN:[[id number]],FOLLOWUP:[2 weeks]],FREE:[LAST:[zaina],FIRST:[isauro],PHONE:[phone]],FAX:[(   )    -],FOLLOWUP:[1 week]]

## 2019-09-17 NOTE — PROGRESS NOTE ADULT - SUBJECTIVE AND OBJECTIVE BOX
GILMA CROWDER  53y  Female      Patient is a 53y old  Female who presents with a chief complaint of infected toe (17 Sep 2019 11:19)      INTERVAL HPI/OVERNIGHT EVENTS: foot pain well controlled today. able to walk      REVIEW OF SYSTEMS:  as above  All other review of systems negative    T(C): 36.4 (09-17-19 @ 05:21), Max: 36.7 (09-16-19 @ 22:19)  HR: 52 (09-17-19 @ 05:21) (52 - 62)  BP: 116/57 (09-17-19 @ 05:21) (103/51 - 116/57)  RR: 16 (09-17-19 @ 05:21) (16 - 16)  SpO2: --  Wt(kg): --Vital Signs Last 24 Hrs  T(C): 36.4 (17 Sep 2019 05:21), Max: 36.7 (16 Sep 2019 22:19)  T(F): 97.5 (17 Sep 2019 05:21), Max: 98.1 (16 Sep 2019 22:19)  HR: 52 (17 Sep 2019 05:21) (52 - 62)  BP: 116/57 (17 Sep 2019 05:21) (103/51 - 116/57)  BP(mean): --  RR: 16 (17 Sep 2019 05:21) (16 - 16)  SpO2: --        PHYSICAL EXAM:  GENERAL: NAD  PSYCH: no agitation, baseline mentation  NERVOUS SYSTEM:  Alert & Oriented X3, no new focal deficits  PULMONARY: Clear to percussion bilaterally; No rales, rhonchi, wheezing, or rubs  CARDIOVASCULAR: Regular rate and rhythm; No murmurs, rubs, or gallops  GI: Soft, Nontender, Nondistended; Bowel sounds present  EXTREMITIES: foot dressed, darco shoe, bearing weight    Consultant(s) Notes Reviewed:  [x ] YES  [ ] NO    Discussed with Consultants/Other Providers [ x] YES     LABS                  Lactate Trend        CAPILLARY BLOOD GLUCOSE            RADIOLOGY & ADDITIONAL TESTS:    Imaging Personally Reviewed:  [ ] YES  [ ] NO    HEALTH ISSUES - PROBLEM Dx:  Gastroesophageal reflux disease, esophagitis presence not specified: Gastroesophageal reflux disease, esophagitis presence not specified  Anxiety and depression: Anxiety and depression  Cellulitis and abscess of foot: Cellulitis and abscess of foot

## 2019-09-17 NOTE — PROGRESS NOTE ADULT - ASSESSMENT
52yo f c PMHx anxiety/depression here with infected L foot 2nd digit, purulent cellulitis- deep infection with cautery    cx growing YAW   IV ancef 2g q8hr, goal x 6 week of therapy, s/p picc  analgesia  bowel regimen  WBAT darco on left foot  local wound care; wash with NS, apply xeroform dsd kerlix ace left foot q24h  MRI findings-  Second DIP dorsal ulcer, nodular nailbed soft tissue   swelling, second DIP joint effusion and erosion  patient does not CLINICALLY appear to have septic arthritis with palpable joint effusion, will treat with prolonged antibiotic IV after discussion with podiatry and ID- all in agreement with plan    no evidence of bacteremia            #Progress Note Handoff    Pending (specify):  Consults_________, Tests________, Test Results_______, Other_____post op wound care, on iv abx____    Family discussion: plan of care discussed with patient    Disposition: Home with home wound care anticipate for discharge tomorrow, summary prepared

## 2019-09-17 NOTE — DISCHARGE NOTE PROVIDER - CARE PROVIDER_API CALL
Rocky Pepper (DPNEPTALI)  [facility]  [address] [facility], [address]st [address]  [place] [address]  Phone: [phone]  Fax: [phone]  Follow Up Time: Routine    Dale Joseph)  Infectious Disease; Internal Medicine  [address] [place] [id number]  Phone: [phone]  Fax: [phone]  Follow Up Time: 2 weeks Rocky Pepper (DPM)  [facility]  [address] [facility], 1st [address]  [place] [address]  Phone: [phone]  Fax: [phone]  Follow Up Time: 1 week    aDle Joseph (MD)  Infectious Disease; Internal Medicine  [address] [place] [address]  Phone: [phone]  Fax: [phone]  Follow Up Time: 2 weeks    isauro mahajan  Phone: [phone]  Fax: (   )    -  Follow Up Time: 1 week

## 2019-09-17 NOTE — DISCHARGE NOTE PROVIDER - HOSPITAL COURSE
52yo f c PMHx anxiety/depression here with infected L foot 2nd digit, purulent cellulitis- deep infection with cautery        cx growing YAW    d/w ID, and podiatrist- transitioning to IV ancef 2g q8hr, goal x 6 week of therapy, home PICC line    WBAT darco on left foot    local wound care; wash with NS, apply xeroform dsd kerlix ace left foot q24h        medically stable for discharge home with IV abx, home wound care    weekly esr,crp,cmp,cbc to be drawn at home by home care and followed as outpatient by ID team Dr. Joseph's service    Will see Dr. Pepper for podiatry f/u as outpatient    pmd f/u in 1-2 weeks

## 2019-09-17 NOTE — DISCHARGE NOTE PROVIDER - NSDCFUADDINST_GEN_ALL_CORE_FT
darco shoe on dressed foot. ambulate per physical therapy instructions. local wound care daily per podiatry recommendation- set up with home wound care darco shoe on dressed foot. ambulate per physical therapy instructions. local wound care daily per podiatry recommendation- set up with home wound care    WBAT darco on left foot  local wound care; wash with NS, apply xeroform dsd kerlix ace left foot q24h

## 2019-09-17 NOTE — DISCHARGE NOTE PROVIDER - NSDCCPCAREPLAN_GEN_ALL_CORE_FT
PRINCIPAL DISCHARGE DIAGNOSIS  Diagnosis: Cellulitis and abscess of foot  Assessment and Plan of Treatment: purulent cellulitis on dorsum of foot, s/p I+D. cultures growing YAW. local wound care per podiatry recommendation set up with home care. has PICC line for 6 weeks of IV antibiotic therapy- as agreed upon by myself, Infectious disease and podiatry team as well as patient. darco shoe to dressed foot. outpatient ID and podiatry f/u. pmd f/u in 1 week PRINCIPAL DISCHARGE DIAGNOSIS  Diagnosis: Cellulitis and abscess of foot  Assessment and Plan of Treatment: purulent cellulitis on dorsum of foot, s/p I+D. cultures growing YAW. local wound care per podiatry recommendation set up with home care. has PICC line for 6 weeks of IV antibiotic therapy- as agreed upon by myself, Infectious disease and podiatry team as well as patient. darco shoe to dressed foot. outpatient ID and podiatry f/u. pmd f/u in 1 week. f/u o/p 1 week [address] with Dr. Pepper

## 2019-09-18 ENCOUNTER — TRANSCRIPTION ENCOUNTER (OUTPATIENT)
Age: 54
End: 2019-09-18

## 2019-09-18 VITALS
DIASTOLIC BLOOD PRESSURE: 56 MMHG | HEART RATE: 62 BPM | RESPIRATION RATE: 16 BRPM | TEMPERATURE: 96 F | SYSTOLIC BLOOD PRESSURE: 107 MMHG

## 2019-09-18 LAB
CULTURE RESULTS: SIGNIFICANT CHANGE UP
CULTURE RESULTS: SIGNIFICANT CHANGE UP
SPECIMEN SOURCE: SIGNIFICANT CHANGE UP
SPECIMEN SOURCE: SIGNIFICANT CHANGE UP

## 2019-09-18 PROCEDURE — 99239 HOSP IP/OBS DSCHRG MGMT >30: CPT

## 2019-09-18 RX ORDER — OXYCODONE AND ACETAMINOPHEN 5; 325 MG/1; MG/1
1 TABLET ORAL
Qty: 40 | Refills: 0
Start: 2019-09-18 | End: 2019-09-27

## 2019-09-18 RX ADMIN — PANTOPRAZOLE SODIUM 40 MILLIGRAM(S): 20 TABLET, DELAYED RELEASE ORAL at 05:31

## 2019-09-18 RX ADMIN — Medication 100 MILLIGRAM(S): at 05:32

## 2019-09-18 RX ADMIN — Medication 1 DROP(S): at 05:31

## 2019-09-18 RX ADMIN — Medication 100 MILLIGRAM(S): at 05:31

## 2019-09-18 NOTE — PROGRESS NOTE ADULT - PROVIDER SPECIALTY LIST ADULT
Hospitalist
Infectious Disease
Podiatry
Podiatry
Hospitalist
Hospitalist

## 2019-09-18 NOTE — DISCHARGE NOTE NURSING/CASE MANAGEMENT/SOCIAL WORK - PATIENT PORTAL LINK FT
You can access the FollowMyHealth Patient Portal offered by [facility] by registering at the following website: [email or web address]. By joining [facility]’s FollowMyHealth portal, you will also be able to view your health information using other applications (apps) compatible with our system.

## 2019-09-18 NOTE — PROGRESS NOTE ADULT - REASON FOR ADMISSION
infected toe

## 2019-09-18 NOTE — PROGRESS NOTE ADULT - SUBJECTIVE AND OBJECTIVE BOX
GILMA CROWDER  53y  Female      Patient is a 53y old  Female who presents with a chief complaint of infected toe (17 Sep 2019 13:35)      INTERVAL HPI/OVERNIGHT EVENTS:  pt seen and examined at bedside this morning; picc line left arm; IV antibiotics arranged for 6 weeks by home care   -medically stable for home with home abx discharge today  -outpatient follow up with her PMD and Podiatry and ID     Vital Signs Last 24 Hrs  T(C): 35.6 (18 Sep 2019 05:42), Max: 36.3 (17 Sep 2019 14:00)  T(F): 96.1 (18 Sep 2019 05:42), Max: 97.4 (17 Sep 2019 14:00)  HR: 62 (18 Sep 2019 05:42) (59 - 67)  BP: 107/56 (18 Sep 2019 05:42) (104/52 - 127/66)  RR: 16 (18 Sep 2019 05:42) (16 - 16)    PHYSICAL EXAM:  GENERAL: NAD, well-groomed, well-developed  HEAD:  Atraumatic, Normocephalic  EYES: EOMI, PERRLA, conjunctiva and sclera clear  NERVOUS SYSTEM:  Alert & Oriented X 4, Good concentration; Motor Strength 5/5 B/L upper and lower extremities; DTRs 2+ intact and symmetric  CHEST/LUNG: Clear to percussion bilaterally; No rales, rhonchi, wheezing, or rubs  CV/HEART: Regular rate and rhythm; No murmurs, rubs, or gallops  GI/ABDOMEN: Soft, Nontender, Nondistended; Bowel sounds present  EXTREMITIES: Left foot dressing   SKIN: No rashes or lesions    LAB:    no new labs today     RADIOLOGY:    Imaging Personally Reviewed:  [Y ] YES  [ ] NO    HEALTH ISSUES - PROBLEM Dx:  Gastroesophageal reflux disease, esophagitis presence not specified: Gastroesophageal reflux disease, esophagitis presence not specified  Anxiety and depression: Anxiety and depression  Cellulitis and abscess of foot: Cellulitis and abscess of foot    MEDS:  benzocaine 15 mG/menthol 3.6 mG (Sugar-Free) Lozenge 1 Lozenge Oral every 2 hours PRN  ceFAZolin   IVPB 2000 milliGRAM(s) IV Intermittent every 8 hours  diazepam    Tablet 5 milliGRAM(s) Oral three times a day PRN  docusate sodium 100 milliGRAM(s) Oral three times a day  enoxaparin Injectable 40 milliGRAM(s) SubCutaneous daily  ibuprofen  Tablet. 400 milliGRAM(s) Oral every 6 hours PRN  morphine  - Injectable 2 milliGRAM(s) IV Push every 6 hours PRN  morphine  - Injectable 4 milliGRAM(s) IV Push every 4 hours PRN  ofloxacin 0.3% Solution 1 Drop(s) Both EYES daily  pantoprazole    Tablet 40 milliGRAM(s) Oral before breakfast  senna 2 Tablet(s) Oral at bedtime  sertraline 100 milliGRAM(s) Oral daily

## 2019-09-19 PROBLEM — F41.9 ANXIETY DISORDER, UNSPECIFIED: Chronic | Status: ACTIVE | Noted: 2019-09-12

## 2019-09-19 PROBLEM — Z00.00 ENCOUNTER FOR PREVENTIVE HEALTH EXAMINATION: Status: ACTIVE | Noted: 2019-09-19

## 2019-09-19 RX ORDER — OXYCODONE AND ACETAMINOPHEN 5; 325 MG/1; MG/1
5-325 TABLET ORAL
Qty: 40 | Refills: 0 | Status: ACTIVE | COMMUNITY
Start: 2019-09-19 | End: 1900-01-01

## 2019-09-19 RX ORDER — IBUPROFEN 600 MG/1
600 TABLET, FILM COATED ORAL
Qty: 120 | Refills: 3 | Status: ACTIVE | COMMUNITY
Start: 2019-09-19 | End: 1900-01-01

## 2019-09-24 ENCOUNTER — APPOINTMENT (OUTPATIENT)
Dept: PODIATRY | Facility: CLINIC | Age: 54
End: 2019-09-24
Payer: MEDICAID

## 2019-09-24 VITALS
DIASTOLIC BLOOD PRESSURE: 76 MMHG | HEIGHT: 60 IN | SYSTOLIC BLOOD PRESSURE: 120 MMHG | BODY MASS INDEX: 19.63 KG/M2 | WEIGHT: 100 LBS

## 2019-09-24 PROCEDURE — 99024 POSTOP FOLLOW-UP VISIT: CPT

## 2019-09-27 DIAGNOSIS — M79.675 PAIN IN LEFT TOE(S): ICD-10-CM

## 2019-09-27 DIAGNOSIS — F17.210 NICOTINE DEPENDENCE, CIGARETTES, UNCOMPLICATED: ICD-10-CM

## 2019-09-27 DIAGNOSIS — L03.032 CELLULITIS OF LEFT TOE: ICD-10-CM

## 2019-09-27 DIAGNOSIS — K21.9 GASTRO-ESOPHAGEAL REFLUX DISEASE WITHOUT ESOPHAGITIS: ICD-10-CM

## 2019-09-27 DIAGNOSIS — B07.9 VIRAL WART, UNSPECIFIED: ICD-10-CM

## 2019-09-27 DIAGNOSIS — F41.9 ANXIETY DISORDER, UNSPECIFIED: ICD-10-CM

## 2019-09-27 DIAGNOSIS — F32.9 MAJOR DEPRESSIVE DISORDER, SINGLE EPISODE, UNSPECIFIED: ICD-10-CM

## 2019-09-27 DIAGNOSIS — I96 GANGRENE, NOT ELSEWHERE CLASSIFIED: ICD-10-CM

## 2019-09-27 DIAGNOSIS — L02.612 CUTANEOUS ABSCESS OF LEFT FOOT: ICD-10-CM

## 2019-09-27 DIAGNOSIS — B95.61 METHICILLIN SUSCEPTIBLE STAPHYLOCOCCUS AUREUS INFECTION AS THE CAUSE OF DISEASES CLASSIFIED ELSEWHERE: ICD-10-CM

## 2019-09-27 DIAGNOSIS — Z91.81 HISTORY OF FALLING: ICD-10-CM

## 2019-09-27 DIAGNOSIS — K59.00 CONSTIPATION, UNSPECIFIED: ICD-10-CM

## 2019-10-01 ENCOUNTER — OUTPATIENT (OUTPATIENT)
Dept: OUTPATIENT SERVICES | Facility: HOSPITAL | Age: 54
LOS: 1 days | End: 2019-10-01
Payer: MEDICAID

## 2019-10-01 DIAGNOSIS — Z98.891 HISTORY OF UTERINE SCAR FROM PREVIOUS SURGERY: Chronic | ICD-10-CM

## 2019-10-01 PROCEDURE — G9001: CPT

## 2019-10-02 NOTE — ASSESSMENT
[FreeTextEntry1] : foot appears healing well no signs of infection \par patient can follow up in 2 weeks \par

## 2019-10-09 DIAGNOSIS — Z71.89 OTHER SPECIFIED COUNSELING: ICD-10-CM

## 2019-10-15 ENCOUNTER — OUTPATIENT (OUTPATIENT)
Dept: OUTPATIENT SERVICES | Facility: HOSPITAL | Age: 54
LOS: 1 days | Discharge: HOME | End: 2019-10-15

## 2019-10-15 DIAGNOSIS — Z98.891 HISTORY OF UTERINE SCAR FROM PREVIOUS SURGERY: Chronic | ICD-10-CM

## 2019-10-16 DIAGNOSIS — L03.90 CELLULITIS, UNSPECIFIED: ICD-10-CM

## 2019-10-16 DIAGNOSIS — N39.0 URINARY TRACT INFECTION, SITE NOT SPECIFIED: ICD-10-CM

## 2019-10-22 ENCOUNTER — APPOINTMENT (OUTPATIENT)
Dept: PODIATRY | Facility: CLINIC | Age: 54
End: 2019-10-22

## 2019-10-23 ENCOUNTER — APPOINTMENT (OUTPATIENT)
Dept: PODIATRY | Facility: CLINIC | Age: 54
End: 2019-10-23
Payer: MEDICAID

## 2019-10-23 ENCOUNTER — OUTPATIENT (OUTPATIENT)
Dept: OUTPATIENT SERVICES | Facility: HOSPITAL | Age: 54
LOS: 1 days | Discharge: HOME | End: 2019-10-23

## 2019-10-23 DIAGNOSIS — Z98.891 HISTORY OF UTERINE SCAR FROM PREVIOUS SURGERY: Chronic | ICD-10-CM

## 2019-10-23 DIAGNOSIS — L02.619 CUTANEOUS ABSCESS OF UNSPECIFIED FOOT: ICD-10-CM

## 2019-10-23 PROCEDURE — 10060 I&D ABSCESS SIMPLE/SINGLE: CPT | Mod: LT,58

## 2019-10-23 RX ORDER — OXYCODONE AND ACETAMINOPHEN 5; 325 MG/1; MG/1
5-325 TABLET ORAL
Qty: 42 | Refills: 0 | Status: ACTIVE | COMMUNITY
Start: 2019-10-23 | End: 1900-01-01

## 2019-10-25 NOTE — ASSESSMENT
[FreeTextEntry1] : Patient examined, history and chart reviewed\par Xploritory i and d preformed on 2nd toe left foot using # 15 blade \par Patient tolderated procedure welll\par Locla lidocaine was given prior to procedure \par PAtient to continuie local carre and antibiotics \par no purulent drainage noted\par discussed with patient that toe will remain larger than others due to trauma \par rx pain meds \par Fu in 2 weeks \par

## 2019-11-06 ENCOUNTER — TRANSCRIPTION ENCOUNTER (OUTPATIENT)
Age: 54
End: 2019-11-06

## 2019-11-07 ENCOUNTER — APPOINTMENT (OUTPATIENT)
Dept: PODIATRY | Facility: CLINIC | Age: 54
End: 2019-11-07

## 2019-11-24 ENCOUNTER — TRANSCRIPTION ENCOUNTER (OUTPATIENT)
Age: 54
End: 2019-11-24

## 2020-01-05 ENCOUNTER — TRANSCRIPTION ENCOUNTER (OUTPATIENT)
Age: 55
End: 2020-01-05

## 2020-01-31 NOTE — ED ADULT NURSE NOTE - EXPLANATION OF PATIENT'S REASON FOR LEAVING
[facility]   Operative Note    Pre-operative diagnosis: Incarcerated ventral hernia [K43.6]   Post-operative diagnosis Same, incarcerated pre-peritoneal fat.    Procedure: Procedure(s):  VENTRAL HERNIA REPAIR   Surgeon(s): Surgeon(s) and Role:     * Love Trejo MD - Primary   Assistant:      Estimated blood loss: Lenin Le PA-C  The Physician Assistant was medically necessary for their expertise in prepping, camera management, suctioning, suturing and retraction.  20 ml   Specimens: None   Findings: Incarcerated pre-peritoneal fat.      Indication for procedure:   This is a 66 year old male who presented to clinic for a symptomatic ventral hernia that he first noticed today. When he arrived in the ED, he had nausea and dry heaves along with a new, tender lump in his epigastrium. CT confirmed that there was a loop of small bowel within the hernia. After some IV pain medication, the small bowel easily reduced. I could not definitively feel fascial edges, so I suspected that the hernia was only partially reduced. We discussed hernia repair without mesh given the recent bowel incarceration and the patient agreed to proceed.    Description of procedure:   Patient was brought to the operating room, placed on the operating table in supine position. Anesthesia was induced. His pressure points were padded and she was secured with a safety strap. A timeout was called to verify the patient, site of procedure and procedure to be performed.    After injecting 0.5% marcaine, a 3 cm epigastric midline incision was created. The subcutaneous tissue was dissected, down to the fascia with cautery. There was a ~ 3 cm diameter fascial defect with an abundance of preperitoneal fat herniating through. Since this was all pre-peritoneal, there was not a defined hernia sac. The preperitoneal fat was mostly reduced back through the defect, the last segment was excised when it would not reduce back easily. The  fascial edges were cleared of fat. The hernia seemed to close with the least tension in the transverse direction. Using multiple interrupted 0 PDS sutures, the hernia was closed transversely. No mesh was used because of recent bowel incarceration.    Local was injected along the fascia, subcutaneous tissues and skin. The subcutaneous tissues were closed with running 3-0 vicryl. The skin was closed with running 4-0 vicryl and the skin was covered with dermabond.    Sponge and needle counts were correct prior to case completion.    Love Trejo MD             pt refusing admission to hospital because she has to  her granddaughter from school. pt understands risks of leaving against advice.

## 2020-06-29 NOTE — PRE-ANESTHESIA EVALUATION ADULT - NSANTHOSAYNRD_GEN_A_CORE
No. BLAIR screening performed.  STOP BANG Legend: 0-2 = LOW Risk; 3-4 = INTERMEDIATE Risk; 5-8 = HIGH Risk Source of information: VIOLETTA RENTERIA, Chart review  Patient language: English  : n/a    HPI:  67M with CAD s/p multiple stents, s/p CABG (9/2019 course c/b Afib and LT pleural effusion), Diastolic CHF, HTN, HLD, DM2, Afib on coumadin and ESRD on HD (Tu, Thurs, Sat at [facility]), hyperthyroidism, presented to ER for pain and swelling of left leg since 3 days. Patient reports sustaining injury to posterior aspect of left leg 1 month ago when he accidentally hit his leg against his bed. He reports the wound has not healed since. Reports he developed pain and swelling of left leg since 3 days, 9/10 in severity, with difficulty ambulating. Reports he was advised to get doppler which he reviewed with hic cardiologist today, was told he has no clot and recommended to come to ER. Denies fever, chills, weakness, cough, dysuria, NVDC. (18 Jun 2020 19:29)      This is a Patient is a 67y old  Male who presents with a chief complaint of leg pain. Found to have left leg cellulitis, currently on vancomycin per primary team.  Pt seen and examined at bedside. Pt reports left leg pain around cellulitis region PAIN SCORE:  6/10 and tolerable SCALE USED: (1-10 VNRS). Pt describes pain as aching, non- radiating, alleviated by pain medications, exacerbated by movement. Pt is able to ambulate to the bathroom with tolerable pain control. Pt tolerating PO diet. Pt denies lethargy, nausea, vomiting, constipation and itchiness. Patient stated goal for pain control: to be able to get out of bed to chair and ambulate with tolerable pain control. Plan is for IR for drainage of large left pleural effusion per primary team.    PAST MEDICAL & SURGICAL HISTORY:  ESRD (end stage renal disease) on dialysis: T-Th-Sat  Stented coronary artery: total 15 stents from 8001-8116  Hyperthyroidism  H/O: CVA: after cardiac stent placed 12/15/09 -no residual  Heart Attack: 2/1/07 with subsequent stent  Coronary Stent: 5531-5506 10 stents,  to Ramus 1/2015, cath 01/2016 ( see results in HPI)  Hypercholesterolemia  CAD (Coronary Artery Disease)  DM (Diabetes Mellitus): x 4 yrs without N/N/R  HTN (Hypertension)  S/P CABG x 4  Hemodialysis access, AV graft: 5/2015, L arm  S/P cataract extraction  Boil of Buttock: 2006 and 2008      FAMILY HISTORY:  Family history of coronary artery disease  Family history of diabetes mellitus (Sibling)      Social History:  denies smoking, etoh or illicit drug use (18 Jun 2020 19:29)    Allergies    lisinopril (Other)    MEDICATIONS  (STANDING):  aMIOdarone    Tablet 200 milliGRAM(s) Oral daily  aspirin enteric coated 81 milliGRAM(s) Oral daily  calcium acetate 667 milliGRAM(s) Oral three times a day with meals  chlorhexidine 2% Cloths 1 Application(s) Topical daily  dextrose 5%. 1000 milliLiter(s) (50 mL/Hr) IV Continuous <Continuous>  gabapentin 100 milliGRAM(s) Oral two times a day  glycopyrrolate 9 MICROgram(s)/formoterol 4.8 MICROgram(s) Inhaler 2 Puff(s) Inhalation two times a day  insulin lispro (HumaLOG) corrective regimen sliding scale   SubCutaneous three times a day before meals  insulin lispro (HumaLOG) corrective regimen sliding scale   SubCutaneous at bedtime  ketotifen 0.025% Ophthalmic Solution 1 Drop(s) Both EYES two times a day  lidocaine 2% Gel 1 Application(s) Topical every 8 hours  methimazole 5 milliGRAM(s) Oral daily  metoprolol tartrate 100 milliGRAM(s) Oral two times a day  pantoprazole    Tablet 40 milliGRAM(s) Oral before breakfast  senna 2 Tablet(s) Oral at bedtime  simvastatin 40 milliGRAM(s) Oral at bedtime  sodium chloride 1 Gram(s) Oral three times a day  tiotropium 18 MICROgram(s) Capsule 1 Capsule(s) Inhalation daily  vancomycin  IVPB 1000 milliGRAM(s) IV Intermittent <User Schedule>    MEDICATIONS  (PRN):  acetaminophen   Tablet .. 650 milliGRAM(s) Oral every 6 hours PRN Moderate Pain (4 - 6)  ALBUTerol    90 MICROgram(s) HFA Inhaler 2 Puff(s) Inhalation every 6 hours PRN Shortness of Breath and/or Wheezing      Vital Signs Last 24 Hrs  T(C): 36.6 (29 Jun 2020 05:26), Max: 36.8 (28 Jun 2020 23:20)  T(F): 97.8 (29 Jun 2020 05:26), Max: 98.2 (28 Jun 2020 23:20)  HR: 85 (29 Jun 2020 05:26) (85 - 103)  BP: 105/61 (29 Jun 2020 05:26) (105/61 - 128/77)  BP(mean): --  RR: 18 (29 Jun 2020 05:26) (18 - 18)  SpO2: 98% (29 Jun 2020 05:26) (98% - 99%)  COVID-19 PCR: NotDetec (26 Jun 2020 12:06)    LABS: Reviewed                          12.8   10.32 )-----------( 124      ( 29 Jun 2020 06:13 )             40.5     06-29    128<L>  |  90<L>  |  55<H>  ----------------------------<  113<H>  4.5   |  25  |  10.20<H>    Ca    8.4      29 Jun 2020 06:13      PT/INR - ( 29 Jun 2020 06:13 )   PT: 21.2 sec;   INR: 1.84 ratio         PTT - ( 29 Jun 2020 06:13 )  PTT:34.7 sec      CAPILLARY BLOOD GLUCOSE      POCT Blood Glucose.: 115 mg/dL (29 Jun 2020 07:53)  POCT Blood Glucose.: 143 mg/dL (28 Jun 2020 21:35)  POCT Blood Glucose.: 134 mg/dL (28 Jun 2020 16:34)  POCT Blood Glucose.: 217 mg/dL (28 Jun 2020 11:19)    COVID-19 PCR: NotDetec (26 Jun 2020 12:06)        Radiology:    < from: CT Chest No Cont (06.27.20 @ 09:21) >    EXAM:  CT CHEST                            PROCEDURE DATE:  06/27/2020          INTERPRETATION:  CLINICAL INFORMATION: Large left pleural effusion. Cellulitis. End-stage renal disease    COMPARISON: Chest x-ray 6/26/2020. CT chest 1/20/2020.    PROCEDURE:   CT of the Chest was performed without intravenous contrast.  Sagittal and coronal reformats were performed.    FINDINGS:    LUNGS AND AIRWAYS: Patent central airways.  There is a large loculated left pleural effusion which is increased in size since the previous exam. There is associated left lung compressive atelectasis. Minimal dependent atelectatic changes in the right lung.  PLEURA: No pleural effusion.  MEDIASTINUM AND YOANA: No lymphadenopathy.  VESSELS: Atherosclerotic changes. Left subclavian vein stent.  HEART: Cardiomegaly. Coronary artery calcifications No pericardial effusion.  CHEST WALL AND LOWER NECK: Moderately enlarged thyroid gland is within previous study. This would be better evaluated with ultrasound as clinicallyindicated.  VISUALIZED UPPER ABDOMEN: Within normal limits.  BONES: Sternotomy with prominent sternal dehiscence unchanged from the previous exam. No pre- or poststernal fluid collections. Degenerative changes.    IMPRESSION:     There is a large loculated left pleural effusion which is increased in size since the previous exam. There is associated compressive atelectasis.                  LYNDON URIARTE M.D., ATTENDING RADIOLOGIST  This document has been electronically signed. Jun 27 2020  9:28AM              < end of copied text >    < from: Xray Tibia + Fibula 2 Views, Left (06.22.20 @ 10:15) >  EXAM:  LEG AP&LAT - LEFT                            PROCEDURE DATE:  06/22/2020          INTERPRETATION:  CLINICAL INDICATION:  Diabetic ulceration. Evaluate for osteomyelitis.    COMPARISON:  None    TECHNIQUE:  AP and lateral radiographs of the left lower leg performed.    FINDINGS:  There is no evidence for acute or chronic fracture deformity of the left tibia or fibula. No regions of osteolysis or osteosclerosis identified. No soft tissue swelling is identified. No retained radiodense foreign body noted. Extensive vascular calcification identified. The knee and ankle articulations appear intact. Note is made of a small plantar calcaneal spur as well as spurring along the posterior aspect of the calcaneus, superiorly directed.    IMPRESSION:  As above.                  SHEFALI CANALES M.D., ATTENDING RADIOLOGIST  This document has been electronically signed. Jun 22 2020 11:24AM                < end of copied text >    < from: US Duplex Venous Lower Ext Complete, Bilateral (06.20.20 @ 11:32) >    EXAM:  US DPLX LWR EXT VEINS COMPL BI                            PROCEDURE DATE:  06/20/2020          INTERPRETATION:  CLINICAL INFORMATION: Bilateral leg swelling    COMPARISON: Bilateral lower extremity venous duplex 11/16/2012    TECHNIQUE: Duplex sonography of the BILATERAL LOWER extremity veins with color and spectral Doppler, with and without compression.      FINDINGS:    There is normal compressibility of the bilateral common femoral, femoral and popliteal veins.   Doppler examination shows normal spontaneous and phasic flow.    No calf vein thrombosis is detected.    IMPRESSION:   No evidence of deep venous thrombosis in either lower extremity.                    VANNA MAHER M.D., ATTENDING RADIOLOGIST  This document has been electronically signed. Jun 20 2020 11:37AM    < end of copied text >        < from: Xray Tibia + Fibula 2 Views, Left (06.22.20 @ 10:15) >  EXAM:  LEG AP&LAT - LEFT                            PROCEDURE DATE:  06/22/2020          INTERPRETATION:  CLINICAL INDICATION:  Diabetic ulceration. Evaluate for osteomyelitis.    COMPARISON:  None    TECHNIQUE:  AP and lateral radiographs of the left lower leg performed.    FINDINGS:  There is no evidence for acute or chronic fracture deformity of the left tibia or fibula. No regions of osteolysis or osteosclerosis identified. No soft tissue swelling is identified. No retained radiodense foreign body noted. Extensive vascular calcification identified. The knee and ankle articulations appear intact. Note is made of a small plantar calcaneal spur as well as spurring along the posterior aspect of the calcaneus, superiorly directed.    IMPRESSION:  As above.        < end of copied text >        ORT Score -   Family Hx of substance abuse	Female	Male  Alcohol 	                                              1              3  Illegal drugs	                                      2              3  Rx drugs                                               4	      4  Personal Hx of substance abuse		  Alcohol 	                                               3	      3  Illegal drugs                                  	       4	      4  Rx drugs                                                5	      5  Age between 16- 45 years	               1             1  hx preadolescent sexual abuse	       3	      0  Psychological disease		  ADD, OCD, bipolar, schizophrenia	2	      2  Depression                                    	1	      1  Score totals 0 		  		  a score of 3 or lower indicates low risk for opioid abuse		  a score of 4-7 indicates moderate risk for opioid abuse		  a score of 8 or higher indicates high risk for opioid abuse	    Risk factors associated with adverse outcomes related to opioid treatment  [ ]  Concurrent benzodiazepine use  [ ]  History/ Active substance use or alcohol use disorder  [ ] Psychiatric co-morbidity  [ ] Sleep apnea  [ ] COPD  [ ] BMI> 35  [ ] Liver dysfunction  [X ] Renal dysfunction  [ ] CHF  [ ] Smoker  [X ]  Age > 60 years    4AT (Assessment test for delirium & cognitive impairment)  _________________________________________________________  [1] ALERTNESS  This includes patients who may be markedly drowsy (eg. difficult to rouse and/or obviously sleepy  during assessment) or agitated/hyperactive. Observe the patient. If asleep, attempt to wake with  speech or gentle touch on shoulder. Ask the patient to state their name and address to assist rating.  Normal (fully alert, but not agitated, throughout assessment) 0  Mild sleepiness for <10 seconds after waking, then normal 0  Clearly abnormal 4    [2] AMT4  Age, date of birth, place (name of the hospital or building), current year.  No mistakes 0  1 mistake 1  2 or more mistakes/untestable 2    [3] ATTENTION  Ask the patient: “Please tell me the months of the year in backwards order, starting at December.”  To assist initial understanding one prompt of “what is the month before December?” is permitted.  Months of the year backwards Achieves 7 months or more correctly 0  Starts but scores <7 months / refuses to start 1 Untestable (cannot start because unwell, drowsy, inattentive) 2    [4] ACUTE CHANGE OR FLUCTUATING COURSE  Evidence of significant change or fluctuation in: alertness, cognition, other mental function  (eg. paranoia, hallucinations) arising over the last 2 weeks and still evident in last 24hrs  No 0  Yes 4    4 or above: possible delirium +/- cognitive impairment  1-3: possible cognitive impairment  0: delirium or severe cognitive impairment unlikely (but delirium still possible if [4] information incomplete)  4AT SCORE 0        REVIEW OF SYSTEMS:  CONSTITUTIONAL: No fever. Positive fatigue  RESPIRATORY: No cough, wheezing, chills or hemoptysis; No shortness of breath  CARDIOVASCULAR: No chest pain, palpitations, dizziness, or leg swelling  GASTROINTESTINAL: No abdominal or epigastric pain. No nausea, vomiting; No diarrhea or constipation.   GENITOURINARY: anuric + ckd - on hd  MUSCULOSKELETAL:  left leg pain + constant throbbing pain  NEURO: alert and oriented.  Periods of delirium on 6/23  PSYCHIATRIC:  difficulty sleeping    PHYSICAL EXAM:  GENERAL:  Alert & Oriented X3, Sleepy  CHEST/LUNG: Decreased breath sounds  HEART: Regular rate and rhythm; No murmurs, rubs, or gallops  ABDOMEN: Distended, Nontender, Bowel sounds present  : Anuric on HD  EXTREMITIES:  2+ Peripheral Pulses, No cyanosis, or edema; Rt upper arm HD AV fistua site c/d/i positive thrill and bruit   MUSCULOSKELETAL: + left leg moderate edema and tenderness   SKIN: + dried laceration over left shin noted + dried scab over left calf noted     [ X]  [place]  Reviewed and Copied to Chart. See below.    Plan of care and goal oriented pain management treatment options were discussed with patient and /or primary care giver; all questions and concerns were addressed and care was aligned with patient's wishes.    Educated patient on goal oriented pain management treatment options     06-29-20 @ 09:23 Source of information: VIOLETTA RENTERIA, Chart review  Patient language: English  : n/a    HPI:  67M with CAD s/p multiple stents, s/p CABG (9/2019 course c/b Afib and LT pleural effusion), Diastolic CHF, HTN, HLD, DM2, Afib on coumadin and ESRD on HD (Tu, Thurs, Sat at [facility]), hyperthyroidism, presented to ER for pain and swelling of left leg since 3 days. Patient reports sustaining injury to posterior aspect of left leg 1 month ago when he accidentally hit his leg against his bed. He reports the wound has not healed since. Reports he developed pain and swelling of left leg since 3 days, 9/10 in severity, with difficulty ambulating. Reports he was advised to get doppler which he reviewed with hic cardiologist today, was told he has no clot and recommended to come to ER. Denies fever, chills, weakness, cough, dysuria, NVDC. (18 Jun 2020 19:29)      This is a Patient is a 67y old  Male who presents with a chief complaint of leg pain. Found to have left leg cellulitis, currently on vancomycin per primary team.  Pt seen and examined at bedside. Pt reports left leg pain around cellulitis region PAIN SCORE:  6/10 and tolerable SCALE USED: (1-10 VNRS). Pt describes pain as aching, non- radiating, alleviated by pain medications, exacerbated by movement. Pt is able to ambulate to the bathroom with tolerable pain control. Pt tolerating PO diet. Pt denies lethargy, nausea, vomiting, constipation and itchiness. Patient stated goal for pain control: to be able to get out of bed to chair and ambulate with tolerable pain control. Plan is for IR for drainage of large left pleural effusion per primary team.    PAST MEDICAL & SURGICAL HISTORY:  ESRD (end stage renal disease) on dialysis: T-Th-Sat  Stented coronary artery: total 15 stents from 0344-2874  Hyperthyroidism  H/O: CVA: after cardiac stent placed 12/15/09 -no residual  Heart Attack: 2/1/07 with subsequent stent  Coronary Stent: 5887-1225 10 stents,  to Ramus 1/2015, cath 01/2016 ( see results in HPI)  Hypercholesterolemia  CAD (Coronary Artery Disease)  DM (Diabetes Mellitus): x 4 yrs without N/N/R  HTN (Hypertension)  S/P CABG x 4  Hemodialysis access, AV graft: 5/2015, L arm  S/P cataract extraction  Boil of Buttock: 2006 and 2008      FAMILY HISTORY:  Family history of coronary artery disease  Family history of diabetes mellitus (Sibling)      Social History:  denies smoking, etoh or illicit drug use (18 Jun 2020 19:29)    Allergies    lisinopril (Other)    MEDICATIONS  (STANDING):  aMIOdarone    Tablet 200 milliGRAM(s) Oral daily  aspirin enteric coated 81 milliGRAM(s) Oral daily  calcium acetate 667 milliGRAM(s) Oral three times a day with meals  chlorhexidine 2% Cloths 1 Application(s) Topical daily  dextrose 5%. 1000 milliLiter(s) (50 mL/Hr) IV Continuous <Continuous>  gabapentin 100 milliGRAM(s) Oral two times a day  glycopyrrolate 9 MICROgram(s)/formoterol 4.8 MICROgram(s) Inhaler 2 Puff(s) Inhalation two times a day  insulin lispro (HumaLOG) corrective regimen sliding scale   SubCutaneous three times a day before meals  insulin lispro (HumaLOG) corrective regimen sliding scale   SubCutaneous at bedtime  ketotifen 0.025% Ophthalmic Solution 1 Drop(s) Both EYES two times a day  lidocaine 2% Gel 1 Application(s) Topical every 8 hours  methimazole 5 milliGRAM(s) Oral daily  metoprolol tartrate 100 milliGRAM(s) Oral two times a day  pantoprazole    Tablet 40 milliGRAM(s) Oral before breakfast  senna 2 Tablet(s) Oral at bedtime  simvastatin 40 milliGRAM(s) Oral at bedtime  sodium chloride 1 Gram(s) Oral three times a day  tiotropium 18 MICROgram(s) Capsule 1 Capsule(s) Inhalation daily  vancomycin  IVPB 1000 milliGRAM(s) IV Intermittent <User Schedule>    MEDICATIONS  (PRN):  acetaminophen   Tablet .. 650 milliGRAM(s) Oral every 6 hours PRN Moderate Pain (4 - 6)  ALBUTerol    90 MICROgram(s) HFA Inhaler 2 Puff(s) Inhalation every 6 hours PRN Shortness of Breath and/or Wheezing      Vital Signs Last 24 Hrs  T(C): 36.6 (29 Jun 2020 05:26), Max: 36.8 (28 Jun 2020 23:20)  T(F): 97.8 (29 Jun 2020 05:26), Max: 98.2 (28 Jun 2020 23:20)  HR: 85 (29 Jun 2020 05:26) (85 - 103)  BP: 105/61 (29 Jun 2020 05:26) (105/61 - 128/77)  BP(mean): --  RR: 18 (29 Jun 2020 05:26) (18 - 18)  SpO2: 98% (29 Jun 2020 05:26) (98% - 99%)  COVID-19 PCR: NotDetec (26 Jun 2020 12:06)    LABS: Reviewed                          12.8   10.32 )-----------( 124      ( 29 Jun 2020 06:13 )             40.5     06-29    128<L>  |  90<L>  |  55<H>  ----------------------------<  113<H>  4.5   |  25  |  10.20<H>    Ca    8.4      29 Jun 2020 06:13      PT/INR - ( 29 Jun 2020 06:13 )   PT: 21.2 sec;   INR: 1.84 ratio         PTT - ( 29 Jun 2020 06:13 )  PTT:34.7 sec      CAPILLARY BLOOD GLUCOSE      POCT Blood Glucose.: 115 mg/dL (29 Jun 2020 07:53)  POCT Blood Glucose.: 143 mg/dL (28 Jun 2020 21:35)  POCT Blood Glucose.: 134 mg/dL (28 Jun 2020 16:34)  POCT Blood Glucose.: 217 mg/dL (28 Jun 2020 11:19)    COVID-19 PCR: NotDetec (26 Jun 2020 12:06)        Radiology:    < from: CT Chest No Cont (06.27.20 @ 09:21) >    EXAM:  CT CHEST                            PROCEDURE DATE:  06/27/2020          INTERPRETATION:  CLINICAL INFORMATION: Large left pleural effusion. Cellulitis. End-stage renal disease    COMPARISON: Chest x-ray 6/26/2020. CT chest 1/20/2020.    PROCEDURE:   CT of the Chest was performed without intravenous contrast.  Sagittal and coronal reformats were performed.    FINDINGS:    LUNGS AND AIRWAYS: Patent central airways.  There is a large loculated left pleural effusion which is increased in size since the previous exam. There is associated left lung compressive atelectasis. Minimal dependent atelectatic changes in the right lung.  PLEURA: No pleural effusion.  MEDIASTINUM AND YOANA: No lymphadenopathy.  VESSELS: Atherosclerotic changes. Left subclavian vein stent.  HEART: Cardiomegaly. Coronary artery calcifications No pericardial effusion.  CHEST WALL AND LOWER NECK: Moderately enlarged thyroid gland is within previous study. This would be better evaluated with ultrasound as clinicallyindicated.  VISUALIZED UPPER ABDOMEN: Within normal limits.  BONES: Sternotomy with prominent sternal dehiscence unchanged from the previous exam. No pre- or poststernal fluid collections. Degenerative changes.    IMPRESSION:     There is a large loculated left pleural effusion which is increased in size since the previous exam. There is associated compressive atelectasis.                  LYNDON URIARTE M.D., ATTENDING RADIOLOGIST  This document has been electronically signed. Jun 27 2020  9:28AM              < end of copied text >    < from: Xray Tibia + Fibula 2 Views, Left (06.22.20 @ 10:15) >  EXAM:  LEG AP&LAT - LEFT                            PROCEDURE DATE:  06/22/2020          INTERPRETATION:  CLINICAL INDICATION:  Diabetic ulceration. Evaluate for osteomyelitis.    COMPARISON:  None    TECHNIQUE:  AP and lateral radiographs of the left lower leg performed.    FINDINGS:  There is no evidence for acute or chronic fracture deformity of the left tibia or fibula. No regions of osteolysis or osteosclerosis identified. No soft tissue swelling is identified. No retained radiodense foreign body noted. Extensive vascular calcification identified. The knee and ankle articulations appear intact. Note is made of a small plantar calcaneal spur as well as spurring along the posterior aspect of the calcaneus, superiorly directed.    IMPRESSION:  As above.                  SHEFALI CANALES M.D., ATTENDING RADIOLOGIST  This document has been electronically signed. Jun 22 2020 11:24AM                < end of copied text >    < from: US Duplex Venous Lower Ext Complete, Bilateral (06.20.20 @ 11:32) >    EXAM:  US DPLX LWR EXT VEINS COMPL BI                            PROCEDURE DATE:  06/20/2020          INTERPRETATION:  CLINICAL INFORMATION: Bilateral leg swelling    COMPARISON: Bilateral lower extremity venous duplex 11/16/2012    TECHNIQUE: Duplex sonography of the BILATERAL LOWER extremity veins with color and spectral Doppler, with and without compression.      FINDINGS:    There is normal compressibility of the bilateral common femoral, femoral and popliteal veins.   Doppler examination shows normal spontaneous and phasic flow.    No calf vein thrombosis is detected.    IMPRESSION:   No evidence of deep venous thrombosis in either lower extremity.                    VANNA MAHER M.D., ATTENDING RADIOLOGIST  This document has been electronically signed. Jun 20 2020 11:37AM    < end of copied text >        < from: Xray Tibia + Fibula 2 Views, Left (06.22.20 @ 10:15) >  EXAM:  LEG AP&LAT - LEFT                            PROCEDURE DATE:  06/22/2020          INTERPRETATION:  CLINICAL INDICATION:  Diabetic ulceration. Evaluate for osteomyelitis.    COMPARISON:  None    TECHNIQUE:  AP and lateral radiographs of the left lower leg performed.    FINDINGS:  There is no evidence for acute or chronic fracture deformity of the left tibia or fibula. No regions of osteolysis or osteosclerosis identified. No soft tissue swelling is identified. No retained radiodense foreign body noted. Extensive vascular calcification identified. The knee and ankle articulations appear intact. Note is made of a small plantar calcaneal spur as well as spurring along the posterior aspect of the calcaneus, superiorly directed.    IMPRESSION:  As above.        < end of copied text >        ORT Score -   Family Hx of substance abuse	Female	Male  Alcohol 	                                              1              3  Illegal drugs	                                      2              3  Rx drugs                                               4	      4  Personal Hx of substance abuse		  Alcohol 	                                               3	      3  Illegal drugs                                  	       4	      4  Rx drugs                                                5	      5  Age between 16- 45 years	               1             1  hx preadolescent sexual abuse	       3	      0  Psychological disease		  ADD, OCD, bipolar, schizophrenia	2	      2  Depression                                    	1	      1  Score totals 0 		  		  a score of 3 or lower indicates low risk for opioid abuse		  a score of 4-7 indicates moderate risk for opioid abuse		  a score of 8 or higher indicates high risk for opioid abuse	    Risk factors associated with adverse outcomes related to opioid treatment  [ ]  Concurrent benzodiazepine use  [ ]  History/ Active substance use or alcohol use disorder  [ ] Psychiatric co-morbidity  [ ] Sleep apnea  [ ] COPD  [ ] BMI> 35  [ ] Liver dysfunction  [X ] Renal dysfunction  [ ] CHF  [ ] Smoker  [X ]  Age > 60 years    4AT (Assessment test for delirium & cognitive impairment)  _________________________________________________________  [1] ALERTNESS  This includes patients who may be markedly drowsy (eg. difficult to rouse and/or obviously sleepy  during assessment) or agitated/hyperactive. Observe the patient. If asleep, attempt to wake with  speech or gentle touch on shoulder. Ask the patient to state their name and address to assist rating.  Normal (fully alert, but not agitated, throughout assessment) 0  Mild sleepiness for <10 seconds after waking, then normal 0  Clearly abnormal 4    [2] AMT4  Age, date of birth, place (name of the hospital or building), current year.  No mistakes 0  1 mistake 1  2 or more mistakes/untestable 2    [3] ATTENTION  Ask the patient: “Please tell me the months of the year in backwards order, starting at December.”  To assist initial understanding one prompt of “what is the month before December?” is permitted.  Months of the year backwards Achieves 7 months or more correctly 0  Starts but scores <7 months / refuses to start 1 Untestable (cannot start because unwell, drowsy, inattentive) 2    [4] ACUTE CHANGE OR FLUCTUATING COURSE  Evidence of significant change or fluctuation in: alertness, cognition, other mental function  (eg. paranoia, hallucinations) arising over the last 2 weeks and still evident in last 24hrs  No 0  Yes 4    4 or above: possible delirium +/- cognitive impairment  1-3: possible cognitive impairment  0: delirium or severe cognitive impairment unlikely (but delirium still possible if [4] information incomplete)  4AT SCORE 0        REVIEW OF SYSTEMS:  CONSTITUTIONAL: No fever. Positive fatigue  RESPIRATORY: No cough, wheezing, chills or hemoptysis; No shortness of breath  CARDIOVASCULAR: No chest pain, palpitations, dizziness, or leg swelling  GASTROINTESTINAL: No abdominal or epigastric pain. No nausea, vomiting; No diarrhea or constipation.   GENITOURINARY: anuric + ckd - on hd  MUSCULOSKELETAL:  left leg pain + constant throbbing pain  NEURO: alert and oriented.  Periods of delirium on 6/23  PSYCHIATRIC:  difficulty sleeping    PHYSICAL EXAM:  GENERAL:  Alert & Oriented X3, Sleepy  CHEST/LUNG: Decreased breath sounds  HEART: Regular rate and rhythm; No murmurs, rubs, or gallops  ABDOMEN: Distended, Nontender, Bowel sounds present  : Anuric on HD  EXTREMITIES:  2+ Peripheral Pulses, No cyanosis, or edema; Rt upper arm HD AV fistua site c/d/i positive thrill and bruit   MUSCULOSKELETAL: + left leg moderate edema and tenderness    SKIN: + dried laceration over left shin noted + dried scab over left calf noted + vascular changes noted     [ X]  [place]  Reviewed and Copied to Chart. See below.    Plan of care and goal oriented pain management treatment options were discussed with patient and /or primary care giver; all questions and concerns were addressed and care was aligned with patient's wishes.    Educated patient on goal oriented pain management treatment options     06-29-20 @ 09:23

## 2020-12-19 ENCOUNTER — EMERGENCY (EMERGENCY)
Facility: HOSPITAL | Age: 55
LOS: 0 days | Discharge: HOME | End: 2020-12-19
Attending: EMERGENCY MEDICINE | Admitting: EMERGENCY MEDICINE
Payer: MEDICAID

## 2020-12-19 VITALS
HEART RATE: 88 BPM | TEMPERATURE: 98 F | OXYGEN SATURATION: 100 % | DIASTOLIC BLOOD PRESSURE: 72 MMHG | SYSTOLIC BLOOD PRESSURE: 113 MMHG | WEIGHT: 117.95 LBS | RESPIRATION RATE: 20 BRPM | HEIGHT: 60 IN

## 2020-12-19 DIAGNOSIS — F41.8 OTHER SPECIFIED ANXIETY DISORDERS: ICD-10-CM

## 2020-12-19 DIAGNOSIS — M54.2 CERVICALGIA: ICD-10-CM

## 2020-12-19 DIAGNOSIS — M54.5 LOW BACK PAIN: ICD-10-CM

## 2020-12-19 DIAGNOSIS — S70.12XA CONTUSION OF LEFT THIGH, INITIAL ENCOUNTER: ICD-10-CM
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
## 2020-12-19 DIAGNOSIS — Z98.891 HISTORY OF UTERINE SCAR FROM PREVIOUS SURGERY: Chronic | ICD-10-CM

## 2020-12-19 DIAGNOSIS — Z79.899 OTHER LONG TERM (CURRENT) DRUG THERAPY: ICD-10-CM
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
## 2020-12-19 DIAGNOSIS — Z98.891 HISTORY OF UTERINE SCAR FROM PREVIOUS SURGERY: ICD-10-CM

## 2020-12-19 PROCEDURE — 72170 X-RAY EXAM OF PELVIS: CPT | Mod: 26

## 2020-12-19 PROCEDURE — 99284 EMERGENCY DEPT VISIT MOD MDM: CPT

## 2020-12-19 PROCEDURE — 71046 X-RAY EXAM CHEST 2 VIEWS: CPT | Mod: 26

## 2020-12-19 PROCEDURE — 73552 X-RAY EXAM OF FEMUR 2/>: CPT | Mod: 26,LT

## 2020-12-19 RX ORDER — IBUPROFEN 200 MG
1 TABLET ORAL
Qty: 12 | Refills: 0
Start: 2020-12-19 | End: 2020-12-22

## 2020-12-19 RX ORDER — KETOROLAC TROMETHAMINE 30 MG/ML
30 SYRINGE (ML) INJECTION ONCE
Refills: 0 | Status: DISCONTINUED | OUTPATIENT
Start: 2020-12-19 | End: 2020-12-19

## 2020-12-19 RX ORDER — METHOCARBAMOL 500 MG/1
2 TABLET, FILM COATED ORAL
Qty: 24 | Refills: 0
Start: 2020-12-19 | End: 2020-12-22

## 2020-12-19 RX ORDER — METHOCARBAMOL 500 MG/1
1000 TABLET, FILM COATED ORAL ONCE
Refills: 0 | Status: COMPLETED | OUTPATIENT
Start: 2020-12-19 | End: 2020-12-19

## 2020-12-19 RX ADMIN — METHOCARBAMOL 1000 MILLIGRAM(S): 500 TABLET, FILM COATED ORAL at 18:09

## 2020-12-19 RX ADMIN — Medication 30 MILLIGRAM(S): at 18:09

## 2020-12-19 NOTE — ED PROVIDER NOTE - NS ED ROS FT
Constitutional: See HPI.  Eyes: No visual changes, eye pain or discharge.   ENMT: No hearing changes, pain, discharge or infections. + neck pain. No limited ROM  Cardiac: No SOB or edema. No chest pain with exertion.  Respiratory: No cough or respiratory distress  GI: No nausea, vomiting, diarrhea or abdominal pain.  : No dysuria, frequency or burning. No Discharge  MS: + back and L leg pain. No myalgia, muscle weakness,   Neuro: No headache or weakness.   Skin: No skin rash.  Except as documented in the HPI, all other systems are negative.

## 2020-12-19 NOTE — ED PROVIDER NOTE - PATIENT PORTAL LINK FT
You can access the FollowMyHealth Patient Portal offered by [facility] by registering at the following website: [email or web address]. By joining [employer]’s FollowMyHealth portal, you will also be able to view your health information using other applications (apps) compatible with our system.

## 2020-12-19 NOTE — ED PROVIDER NOTE - PHYSICAL EXAMINATION
CONST: Well appearing in NAD  HEAD: NC/AT  EYES: Sclera and conjunctiva clear.  NECK: + bilateral cervical paravertebral tenderness, no midline tenderness, no step off or deformity.   CARD: Normal S1 S2; Normal rate and rhythm  RESP: Equal BS B/L, No wheezes, rhonchi or rales. No distress  GI: Soft, non-tender, non-distended.  MS: + ecchymosis L lateral thigh, + TTP bilateral lumbar paravertebral muscles, no midline tenderness, no TTP upper extremities. Normal ROM in all extremities. no calf pain, radial pulses 2+ bilaterally  SKIN: Warm, dry, no acute rashes. Good turgor  NEURO: A&Ox3, GCS15, No focal deficits. Strength 5/5 with no sensory deficits. Steady gait

## 2020-12-19 NOTE — ED PROVIDER NOTE - NSFOLLOWUPINSTRUCTIONS_ED_ALL_ED_FT
Back Pain    Back pain is very common in adults. The cause of back pain is rarely dangerous and the pain often gets better over time. The cause of your back pain may not be known and may include strain of muscles or ligaments, degeneration of the spinal disks, or arthritis. Occasionally the pain may radiate down your leg(s). Over-the-counter medicines to reduce pain and inflammation are often the most helpful. Stretching and remaining active frequently helps the healing process.     SEEK IMMEDIATE MEDICAL CARE IF YOU HAVE ANY OF THE FOLLOWING SYMPTOMS: bowel or bladder control problems, unusual weakness or numbness in your arms or legs, nausea or vomiting, abdominal pain, fever, dizziness/lightheadedness.    Cervical Sprain (neck sprain)    A cervical sprain is when the tissues (ligaments) that hold the neck bones in place stretch or tear. Only take medicine as told by your doctor. You may apply heating or cooling pads (or ice) to help with the pain. Avoid positions and activities that make your problems worse.    SEEK IMMEDIATE MEDICAL CARE IF YOU HAVE THE FOLLOWING SYMPTOMS: weakness, tingling, or numbness of part of the body, headache, dizziness or lightheadedness, fever, or difficulty swallowing or chewing    ECCHYMOSIS - AfterCare(R) Instructions(ER/ED)     Ecchymosis    WHAT YOU NEED TO KNOW:    Ecchymosis is a collection of blood under the skin. Blood leaks from blood vessels and collects in nearby tissues. This can happen anywhere just below the skin, or in a mucus membrane, such as your mouth. Ecchymosis may appear as a large red, blue, or purple area of skin. You may also have pain or swelling in the area. Signs and symptoms may move to nearby body areas. It is important for you to follow up with your healthcare provider. Some causes of ecchymosis are serious and need medical treatment.    DISCHARGE INSTRUCTIONS:    Contact your healthcare provider if:     You have new symptoms.      Your bruise suddenly gets bigger and feels hard.       The affected area does not improve within 2 weeks.      You have ecchymosis around your eye and you are having trouble seeing.      You have questions or concerns about your condition or care.    Self-care:     Rest the area to help the tissues heal.       Apply ice to the area to relieve pain and swelling. Ice can also help prevent tissue damage. Use an ice pack, or put crushed ice in a bag. Cover the ice pack or bag with a small towel before you apply it to your skin. Apply ice for 20 minutes every hour, or as directed.       Elevate the affected area to reduce swelling, and to improve circulation. Prop the area on pillows to keep it elevated above the level of your heart. Do this as often as possible.      NSAID medicines such as ibuprofen can help reduce pain and swelling. NSAIDs are available without a prescription. Ask your healthcare provider which medicine is right for you. Ask how much to take and how often to take it. Follow directions. NSAIDs can cause stomach bleeding and kidney damage if not taken correctly. If you take blood thinner medicine, always ask if NSAIDs are safe for you.    Follow up with your healthcare provider as directed: Write down your questions so you remember to ask them during your visits.       © Copyright [employer] 2020       back to top                      © Copyright [employer] 2020    Methocarbamol (Oral) (Tablet) - Med Essential Fact Sheet             Rx static image Methocarbamol (Robaxin, Robaxin-750) - (By mouth)   Why this medicine is used:  Treats muscle pain and spasms.    Contact a nurse or doctor right away if you have:•Slow heartbeat, seizure  •Dark urine or pale stools, nausea, vomiting, loss of appetite, stomach pain, yellow skin or eyes  •Unusual bleeding, bruising, or weakness  •Blurred vision, redness, pain, or swelling of the eyes, fever  •Lightheadedness, dizziness, fainting       Common side effects:•Confusion, trouble sleeping, headache  •Warmth or redness in your face, neck, arms, or upper chest         DO NOT TAKE VALIUM AND ROBAXIN AT THE SAME TIME.  DO NOT TAKE ROBAXIN IF YOU ARE DRIVING.  DO NOT TAKE IF YOU DRINK ALCOHOL

## 2020-12-19 NOTE — ED PROVIDER NOTE - CLINICAL SUMMARY MEDICAL DECISION MAKING FREE TEXT BOX
pt presented s/p fall yesterday, primary survey intact, second survey with ecchymoses to L lateral thigh and paraspinal pain to lumbar spine, feeling better, ambulatory in ed, gcs 15, nih 0, no focal deficits, pt aware of symptomatic treatment, I discussed with patient need for possible MRI for further work up for their symptoms and how this was not possible in the Emergency Department at this time.  Follow up being provided to have further evaluation for this test given.  aware of signs and symptoms to return for, will follow up with pmd and rehab as discussed. reports will follow up.

## 2020-12-19 NOTE — ED PROVIDER NOTE - PROGRESS NOTE DETAILS
ED Attending KIRK Mcdonald  Primary survey intact. GCS 15. Pt aware of plan, including cxr, pelvic xray and femur xray, agrees, gcs 15, will continue to monitor and reassess. Discussed results with pt.  All questions were answered and return precautions discussed.  Pt is asx and comfortable at this time.  Unremarkable re-exam.  No further concerns at this time from pt.  Will follow up with PMD and rehab clinic.  Pt understands and agrees with tx plan. ambulating in the ED.

## 2020-12-19 NOTE — ED PROVIDER NOTE - OBJECTIVE STATEMENT
55 y.o female w/ hx of anxiety and depression presents to the ED for evaluation of fall.  Mechanical trip and fall down 7 steps on buttocks w/ no LOC. no anticoagulation. states she lightly hit head on step.  Since then developed neck and low back pain, intermittent, throbbing, worse w/ movement, moderate severity, no radiation of pain.  Reports bruise to L lateral lag, ambulating without difficulty. Tried flexeril 5 mg w/ minimal relief.  NO headache, nausea, vomiting, paresthesias, abd pain, chest pain, dyspnea,  saddle anesthesia, bowel/bladder incontinence/ retention, weakness of lower extremities.

## 2020-12-19 NOTE — ED PROVIDER NOTE - NSFOLLOWUPCLINICS_GEN_ALL_ED_FT
[facility] ([facility])  Rehabilitation  [facility] [address], [address] [place] [address]  Phone: [phone]  Fax:   Follow Up Time: 1-3 Days

## 2020-12-19 NOTE — ED PROVIDER NOTE - CARE PLAN
Assessment and plan of treatment:	Plan: Pain control, CXR, Pelvic xray, L femur xray, reassess.   Principal Discharge DX:	Neck pain  Assessment and plan of treatment:	Plan: Pain control, CXR, Pelvic xray, L femur xray, reassess.  Secondary Diagnosis:	Back pain  Secondary Diagnosis:	Ecchymosis  Secondary Diagnosis:	Pain of left lower extremity

## 2020-12-19 NOTE — ED PROVIDER NOTE - ATTENDING CONTRIBUTION TO CARE
55 y.o f w/ pmhx of anxiety/depression on valium daily presents s/p mechanical fall ~ 2 days ago, reports her steps are steep and she was walking down, missed a step, and slide down on her ~ 7 steps, no loc, reports tapped her head on the last step, but did not hit head hard, complaining of generalized pain from paraspinal upper and lower back, and reporting bruising to L lateral thigh. took Motrin and Flexeril yesterday with no relief, felt worsening muscle tension so came to ed. eating and drinking normally, ambulating, baseline. No fever, chills, n/v, cp,  pleuritic cp, sob, palpitations, diaphoresis, cough, chest wall/rib pain, clavicular pain, ankle pain, knee pain, shoulder pain, wrist pain,  no hip pain, no ha/lh/dizziness, numbness/tingling, neck pain/ stiffness, abd pain,  melena/brbpr, urinary symptoms, saddle paresthesias, urinary or bowel incontinence, edema, calf pain/swelling/erythema, sick contacts, recent travel . GCS 15.    On Exam:  Vital Signs: I have reviewed the initial vital signs.  Constitutional: WDWN female uncomfortable 2/2 to generalzied pain.   HEAD: No signs of basilar skull fracture.  Integumentary: No rash. No laceration, abrasions, or swelling. (+) L lateral thigh ecchymoses.   EYES: No periorbital swelling/ecchymoses. PERRL, EOM intact. No nystagmus. No subconjunctival hemorrhage.   ENT: MMM. No rhinorrhea/otorrhea. No epistaxis,  No septal hematoma. No mastoid ecchymoses. No intraoral bleeding, No loose or craked teeth, no active bleeding. No malocclusion. No TMJ pain.  NECK: Supple, non-tender, No palpable shelves or step-offs. No paraspinal pain to palpation.   BACK: No spinous tenderness. No palpable shelves or step-offs. (+) Lumbar paraspinal pain to palpation.   Cardiovascular: RRR, radial pulses 2/4 b/l. dp and pt pulses 2/4/ b/l. No pain to palpation to chest wall.  Respiratory: BS present b/l, ctabl, no wheezing or crackles, no stridor. No pain to palpation to ribs b/l. No crepitus. No subq emphysema.   Gastrointestinal: BS present throughout all 4 quadrants, soft, nd, nt. no r/g.  Musculoskeletal: FROM of all extremities. No pain to palpation to clavicles, no obvious bony deformities. No hip pain. No short leg. No internal or external rotation of LE. Pain to palpation to L lateral thigh overlying ecchymoses, no deformity. no crepitus, no induration or fluctuance.   Neurologic: GCS 15. CN II-XII intact, no facial droop or slurring of speech. Motor 5/5 and sensation intact throughout upper and lower extremities. (-) Pronator. (-) Romberg. Ambulatory with no difficulty.

## 2021-02-22 ENCOUNTER — TRANSCRIPTION ENCOUNTER (OUTPATIENT)
Age: 56
End: 2021-02-22

## 2021-03-29 ENCOUNTER — TRANSCRIPTION ENCOUNTER (OUTPATIENT)
Age: 56
End: 2021-03-29

## 2021-05-03 NOTE — ED PROVIDER NOTE - PATIENT PORTAL LINK FT
refaxed
You can access the FollowMyHealth Patient Portal offered by [facility] by registering at the following website: [email or web address]. By joining "[employer]’s FollowMyHealth portal, you will also be able to view your health information using other applications (apps) compatible with our system.

## 2021-07-13 NOTE — ED PROVIDER NOTE - NSTIMEPROVIDERCAREINITIATE_GEN_ER
Cardiac Electrophysiology Inpatient Progress Note    Angela Grimm  1949  Date of Service: 7/13/2021  PCP: Jake Martell MD          Subjective: Angela Grimm is seen in hospital follow-up and management of: AF    Angela Grimm is a 66 yo male who I was asked to see in Cardiac Electrophysiology consultation for post op atrial fibrillation.       He has a hx of persistent atrial fibrillation sp DCCV x 2 (2/15/21-failed; 3/12/21), CAD, HTN, DM, obesity, binge EtOH drinking (former alcoholic, now drinks ~5 drinks once a month), diabetic neuropathy, ED, and gout. He was diagnosed with atrial fibrillation in 1/2021 shortly after COVID-19 diagnosis in 12/2020. His AF symptoms are fatigue. He had a failed DCCV on 2/15/21, then started on multaq (atenolol discontinued at this time) and repeat DCCV on 3/12/21, which successfully restored sinus rhythm and resolved patient's symptom of fatigue for ~3 days, then returned to AF. He saw Dr Isreal Louis with above hx and  stress test recommended for chest pain and rhythm control. Stress test ordered was positive and patient was referred for cardiac cath done on 6/16 revealing severe triple vessel CAD     He underwent Coronary artery bypass grafting x2 (left internal mammary  artery to left anterior descending artery and reverse saphenous vein  graft to the obtuse marginal branch of the circumflex), Maze procedure using pulmonary vein isolation, Left atrial appendage ligation using a 40-mm AtriCure appendage clip on 7/9/21     7/10/21 : he was in NSR recovering     7/11/21 : today, In Afib rate 100, asymptomatic except for incisional pain    7/12/21 : he remains in AF, rate 79. He is getting stronger slowly    7/13/21 : he is doing well.  Remains in AF rate 70-80's asymptomatic    Patient Active Problem List   Diagnosis    Type 2 diabetes mellitus with hyperglycemia, without long-term current use of insulin ([address]    HTN (hypertension)    Coronary atherosclerosis 19-Dec-2020 17:42 of native coronary artery    Hyperlipidemia    Class 2 severe obesity due to excess calories with serious comorbidity and body mass index (BMI) of 38.0 to 38.9 in adult [facility])    Atrial fibrillation (HCC)    Carotid stenosis, right    CAD in native artery         Scheduled Medications:   albumin human  50 g Intravenous Once    rivaroxaban  20 mg Oral Daily    amiodarone  200 mg Oral Daily    aspirin  81 mg Oral Daily    bisacodyl  5 mg Oral Daily    sennosides-docusate sodium  1 tablet Oral BID    magnesium hydroxide  30 mL Oral Daily    ferrous sulfate  325 mg Oral BID WC    vitamin C  500 mg Oral BID    folic acid  1 mg Oral Daily    insulin lispro  0-6 Units Subcutaneous TID WC    insulin lispro  0-3 Units Subcutaneous Nightly    bisacodyl  10 mg Rectal Daily    sodium chloride flush  10 mL Intravenous 2 times per day    magnesium oxide  400 mg Oral Daily    pantoprazole  40 mg Oral Daily    ipratropium-albuterol  1 ampule Inhalation Q4H WA    insulin glargine  0.15 Units/kg Subcutaneous Nightly    tamsulosin  0.4 mg Oral Daily    pravastatin  20 mg Oral Nightly       Infusion Medications:      PRN Medications:  acetaminophen, oxyCODONE-acetaminophen **OR** oxyCODONE-acetaminophen, ondansetron, diphenhydrAMINE, sodium chloride flush    Allergies   Allergen Reactions    Adhesive Tape Dermatitis    Crestor [Rosuvastatin Calcium]      Muscles aches     Lipitor [Atorvastatin]      Muscle aches       Wt Readings from Last 3 Encounters:   07/13/21 241 lb 6.4 oz (109.5 kg)   07/07/21 236 lb (107 kg)   07/06/21 236 lb (107 kg)     Temp Readings from Last 3 Encounters:   07/13/21 98.2 °F (36.8 °C) (Temporal)   07/09/21 97 °F (36.1 °C)   07/07/21 96.5 °F (35.8 °C) (Temporal)     BP Readings from Last 3 Encounters:   07/13/21 103/65   07/07/21 (S) (!) 127/57   07/06/21 122/80     Pulse Readings from Last 3 Encounters:   07/13/21 99   07/07/21 77   07/06/21 72         Intake/Output Summary (Last 24 hours) at 7/13/2021 1113  Last data filed at 7/13/2021 1051  Gross per 24 hour   Intake 309.62 ml   Output 1950 ml   Net -1640.38 ml         PHYSICAL EXAM:  Vitals:    07/13/21 0700 07/13/21 0800 07/13/21 1001 07/13/21 1051   BP: (!) 102/56 108/70  103/65   Pulse: 87 79  99   Resp: 18 18 16    Temp: 98.4 °F (36.9 °C) 98.1 °F (36.7 °C)  98.2 °F (36.8 °C)   TempSrc: Temporal Oral  Temporal   SpO2: 96% 95% 93%    Weight:       Height:         Constitutional: Oriented to person, place, and time. Well-developed and cooperative. Head: Normocephalic and atraumatic. Eyes: Conjunctivae are normal.  Neck: No  JVD present. Cardiovascular: S1 normal, S2 normal  An irregular rhythm present. Pulmonary/Chest: Effort normal and breath sounds normal. No respiratory distress. Abdominal: Soft. Normal appearance   Musculoskeletal: Normal range of motion of all extremities, no muscle weakness. Neurological: Alert and oriented to person, place, and time. Skin: Skin is warm and dry. No bruising, no ecchymosis and no rash noted. Extremity: No clubbing or cyanosis. No edema. Psychiatric: Normal mood and affect. Thought content normal.       Pertinent Labs:  CBC:   Recent Labs     07/11/21  0452 07/12/21  0611 07/13/21  0507   WBC 12.6* 11.5 9.6   HGB 12.8 12.6 11.8*   HCT 37.7 37.8 34.8*   PLT 93* 96* 117*     BMP:  Recent Labs     07/12/21  0611 07/12/21  1336 07/13/21  0507   * 128* 134   K 5.2* 5.2* 4.8   CL 97* 95* 100   CO2 19* 20* 24   BUN 44* 49* 54*   CREATININE 1.3* 1.3* 1.4*   CALCIUM 8.8 8.7 8.7     ABGs:   Lab Results   Component Value Date    PO2ART 262.5 07/09/2021    YLF3UWE 43.2 07/09/2021     INR:   No results for input(s): INR in the last 72 hours. BNP: No results for input(s): BNP in the last 72 hours. TSH:   Lab Results   Component Value Date    TSH 1.380 07/24/2019      Cardiac Injury Profile: No results for input(s): CKTOTAL, CKMB, CKMBINDEX, TROPONINI in the last 72 hours.    Lipid Profile: Lab Results   Component Value Date    TRIG 75 2021    HDL 46 2021    LDLCALC 91 2021    CHOL 152 2021      Hemoglobin A1C: No components found for: HGBA1C   Xray: XR CHEST (2 VW)    Result Date: 2021    No acute process. CT chest without contrast    Result Date: 2021  Chronic appearing findings. No significant acute cardiopulmonary process identified. Pertinent Cardiac Testin21 cardiac Cath  LEFT VENTRICULOGRAM:  Revealed mid to basal inferior wall akinesis with  an ejection fraction of 45%-50%.  No mitral regurgitation was noted.     IMPRESSION:  1.  Severe triple-vessel CAD. 2.  Mid to basal inferior wall akinesis with an ejection fraction of  45%-50%. 3.  Elevated LVEDP at 20 mmHg.     3/12/21 CRYSTAL   Normal left ventricle size and systolic function.   Mildly dilated left atrium.   Physiologic and/or trace mitral regurgitation is present.   Mild tricuspid regurgitation.   Mild plaque noted in the descending aorta.       Telemetry2021: AF rate 70      I have independently reviewed all of the ECGs and rhythm strips per above. I have personally reviewed the laboratory, cardiac diagnostic and radiographic testing as outlined above. I have reviewed previous records noted in [address]. Impression:      1. Atrial fibrillation  - Diagnosed with atrial fibrillation in 2021 shortly after COVID-19 diagnosis in 2020  - persistent atrial fibrillation sp DCCV x 2 (2/15/21-failed; 3/12/21~ lasted for 3 days)  - now POD # 3 from CABG, MAZE, Atrial clip  - Last NSR was 7/10 pm.   - Ok with IV amiodarone to see if we can get him back into rhythm; 150 mg IV bolus given with gtt  - Start anticoagulation   - Rate in AF stable  - Epicardial wires not reliably capturing  - Weaned amio gtt, started oral amio 200 mg daily     2. CAD  - s/p CABG 11  - Mid to basal inferior wall akinesis with an ejection fraction of 45%-50% on Cath     3.  Obesity  - Lifestyle modification     4. SANJIV  - resolving    Recommendations:    1. Started oral amio 200 mg daily. He remains in AF. Given hx of prior bradycardia, will up titrate BB slowly. 2. Will arrange follow up in 1 week for ECG check at time of discharge    Pls call if any additional questions. We will sign off    I have spent a total of 35 minutes with the patient and his/her family reviewing the above stated recommendations. A total of >50% of that time involved face-to-face time providing counseling and or coordination of care with the other providers. Thank you for allowing me to participate in your patient's care. Adore Escobar M.D  [facility]    NOTE: This report was transcribed using voice recognition software. Every effort was made to ensure accuracy; however, inadvertent computerized transcription errors may be present.

## 2021-07-30 NOTE — ED PROVIDER NOTE - NS ED MD DISPO ADMITTING SERVICE
42yo M with no past medical or surgical hx p/w intermittent sharp colicky periumbilical and epigastric pain since 10PM last night that began about 1 hour after eating shwarma fast food.  Also admits to nonbloody loose stools and 1-2x NBNB vomiting.  No back pain or lower abdominal pain or testicular pain.  No fevers or other complaints .       General: Patient appears uncomfortable AAO x 3  Skin: Dry and intact  HEENT: Head atraumatic. Oral mucosa moist.   Eyes: Conjunctiva normal  Cardiac: Regular rhythm and rate. No pretibial edema b/l  Respiratory: Lungs clear b/l and symmetric. No respiratory distress. Able to speak in complete sentences.  Gastrointestinal: Abdomen soft, nondistended, +tender mild in LUQ and epigastrium  Musculoskeletal: Moves all extremities spontaneously  Neurological: alert and oriented to person, place, and time  Psychiatric: Cooperative    a/p  ?gastroenteritis vs PUD, ruptured ulcer, gastritis, pancreatitis  labs with lfts, lipase, cxr to eval for pneumoperitoneum, pepcid, ivf  pt declined morphine MED

## 2021-11-17 NOTE — BRIEF OPERATIVE NOTE - CONDITION POST OP
Device: Port  Central Line Site: Right   Central Line Site Appearance: WDL  Implanted port accessed using a 20 gauge non-coring needle primed with 0.9% sodium chloride. Good blood return obtained.  Blood obtained and sent to lab.  Site Care: Aseptic site care per protocol, Occlusive Transparent Dressing, Catheter securement device applied and Injection caps changed/applied  Central line flushed with: 20 ml 0.9 normal saline  Central line removed: no  Jaimes Needle: Jaimes needle left in place    
stable

## 2021-12-16 ENCOUNTER — TRANSCRIPTION ENCOUNTER (OUTPATIENT)
Age: 56
End: 2021-12-16

## 2022-01-14 ENCOUNTER — TRANSCRIPTION ENCOUNTER (OUTPATIENT)
Age: 57
End: 2022-01-14

## 2022-03-23 NOTE — DISCHARGE NOTE PROVIDER - NSDCHHATTENDCERT_GEN_ALL_CORE
Gabbie Guzman  1968       Date of Initial Treatment Plan: 3/23/22   Date of Current Treatment Plan: 03/23/22    Treatment Plan Number 1     Strengths/Personal Resources for Self Care: Dev Maharaj is willing to work on reducing anxiety and depression  Diagnosis:   1  Moderate mixed bipolar II disorder (HCC)     2  Generalized anxiety disorder     3  PTSD (post-traumatic stress disorder)     4  Alcohol abuse, in remission     5  Cigarette nicotine dependence without complication         Area of Needs: Mood      Long Term Goal 1: Adecrease depression to better daily functioning, rate through PHQ9 now and at the end of the program for evidence of symptom change     Target Date: May 4, 22 for the Indovidual Part of the BIP  Completion Date: as listed above         Short Term Objectives for Goal 1: Alearn specific DBT skills pertinent to the BIP and with current med managament, responsible for which is Dev Maharaj, Paychiatrist, and THerapist     Long Term Goal 2: decrease anxiety to better daily functioning, rate thorugh GAD7 now and at the end of [facility] program     Target Date: May 4, 22 for the Individual part of the BIP  Completion Date: as listed above    Short Term Objectives for Goal 2: Alearn specific sets of skills from Freddy Valdez along with current med management, repsonsible for which is Dev Maharaj, Psychiatrist, and Therapist          Long Term Goal # 3: N/A     Target Date: N/A  Completion Date: N/A    Short Term Objectives for Goal 3: N/A    GOAL 1: Modality: Individual 4x per month   Completion Date May 4 for the ind part of the BIP    GOAL 2: Modality: Individual 4x per month   Completion Date May 4, 22 for the ind part of the BIP     GOAL 3: Modality: Individual NAx per month   Completion Date NA      [address]: Diagnosis and Treatment Plan explained to Brian Nathanaelbelinda relates understanding diagnosis and is agreeable to Treatment Plan         Client Comments : Please share your thoughts, feelings, need and/or experiences regarding your treatment plan: Agreeable to follow through  Unruly Sawyer provides her verbal agreement to this [address] at 2:3opm, on 3/23/22  My signature below certifies that the above stated patient is homebound and upon completion of the Face-To-Face encounter, has the need for intermittent skilled nursing, physical therapy and/or speech or occupational therapy services in their home for their current diagnosis as outlined in their initial plan of care. These services will continue to be monitored by myself or another physician.

## 2022-05-15 ENCOUNTER — NON-APPOINTMENT (OUTPATIENT)
Age: 57
End: 2022-05-15

## 2022-11-20 ENCOUNTER — NON-APPOINTMENT (OUTPATIENT)
Age: 57
End: 2022-11-20

## 2022-11-21 ENCOUNTER — EMERGENCY (EMERGENCY)
Facility: HOSPITAL | Age: 57
LOS: 0 days | Discharge: HOME | End: 2022-11-21
Attending: EMERGENCY MEDICINE | Admitting: EMERGENCY MEDICINE

## 2022-11-21 VITALS
OXYGEN SATURATION: 100 % | DIASTOLIC BLOOD PRESSURE: 60 MMHG | SYSTOLIC BLOOD PRESSURE: 126 MMHG | TEMPERATURE: 97 F | RESPIRATION RATE: 18 BRPM | WEIGHT: 100.09 LBS | HEART RATE: 77 BPM

## 2022-11-21 DIAGNOSIS — F41.8 OTHER SPECIFIED ANXIETY DISORDERS: ICD-10-CM

## 2022-11-21 DIAGNOSIS — K08.89 OTHER SPECIFIED DISORDERS OF TEETH AND SUPPORTING STRUCTURES: ICD-10-CM

## 2022-11-21 DIAGNOSIS — K02.9 DENTAL CARIES, UNSPECIFIED: ICD-10-CM

## 2022-11-21 DIAGNOSIS — R20.2 PARESTHESIA OF SKIN: ICD-10-CM

## 2022-11-21 DIAGNOSIS — R42 DIZZINESS AND GIDDINESS: ICD-10-CM

## 2022-11-21 DIAGNOSIS — Z98.891 HISTORY OF UTERINE SCAR FROM PREVIOUS SURGERY: Chronic | ICD-10-CM

## 2022-11-21 PROCEDURE — 99284 EMERGENCY DEPT VISIT MOD MDM: CPT

## 2022-11-21 RX ORDER — MECLIZINE HCL 12.5 MG
1 TABLET ORAL
Qty: 15 | Refills: 0
Start: 2022-11-21 | End: 2022-11-25

## 2022-11-21 RX ORDER — AMOXICILLIN 250 MG/5ML
1 SUSPENSION, RECONSTITUTED, ORAL (ML) ORAL
Qty: 20 | Refills: 0
Start: 2022-11-21 | End: 2022-11-30

## 2022-11-21 RX ORDER — MECLIZINE HCL 12.5 MG
25 TABLET ORAL ONCE
Refills: 0 | Status: COMPLETED | OUTPATIENT
Start: 2022-11-21 | End: 2022-11-21

## 2022-11-21 RX ADMIN — Medication 25 MILLIGRAM(S): at 19:58

## 2022-11-21 NOTE — ED PROVIDER NOTE - NS ED ATTENDING STATEMENT MOD
This was a shared visit with the MONTRELL. I reviewed and verified the documentation and independently performed the documented:

## 2022-11-21 NOTE — ED ADULT NURSE NOTE - CHIEF COMPLAINT QUOTE
C/o left side facial numbness x 3 days Patient states since Thursday. Had recent dental work on left side

## 2022-11-21 NOTE — ED PROVIDER NOTE - PHYSICAL EXAMINATION
CONSTITUTIONAL: Well-appearing; in no apparent distress.   HEAD: Normocephalic; atraumatic.   EYES: Pupils are round and reactive, extra-ocular muscles are intact. Eyelids are normal in appearance without swelling or lesions.   ENT: Hearing is intact with good acuity to spoken voice.  Oral mucosa is pink and moist. No abscess or ulcer noticed in the L inner and outer lip. The pharynx is normal in appearance without tonsillar swelling or exudates.  Patient is speaking clearly, not muffled and airway is intact.   RESPIRATORY: No signs of respiratory distress. Lung sounds are clear in all lobes bilaterally without rales, rhonchi, or wheezes.  CARDIOVASCULAR: Regular rate and rhythm.   GI: Abdomen is soft, non-tender, and without distention. Bowel sounds are present and normoactive in all four quadrants. No masses are noted.   NEURO: A & O x 3. Normal speech. Visual fields are full to confrontation. Pupils are equally reactive to light. Extraocular movement is intact. There is no eye deviation. Facial sensation is intact to light touch. Face is symmetric with normal eye closure and smile. Hearing is normal to spoken voice. Phonation is normal. No upper or lower extremity drift.  PSYCHOLOGICAL: Appropriate mood and affect. Good judgement and insight.

## 2022-11-21 NOTE — ED ADULT TRIAGE NOTE - CHIEF COMPLAINT QUOTE
C/o left side facial numbness since Thursday C/o left side facial numbness x 3 days Patient states since Thursday. Had recent dental work on left side

## 2022-11-21 NOTE — ED PROVIDER NOTE - ATTENDING APP SHARED VISIT CONTRIBUTION OF CARE
56-year-old female past medical history anxiety presents with left facial tingling.  Patient states that for the last 3 days she has been having episodes of tingling sensation to her left upper lip lower nose.  Also describes some room spinning dizziness.  Went to urgent care and was advised to come to the ED for further management.  No fevers or recent illness.  No chest pain, shortness of breath, palpitations.  No fevers.  Reports sensation of chills.  No nausea vomiting or diarrhea.  No abdominal pain.    CONSTITUTIONAL: Well-developed; well-nourished; in no acute distress.   SKIN: warm, dry  HEAD: Normocephalic; atraumatic.  EYES: PERRL, EOMI, no conjunctival erythema. mild leftward nystagmus.   ENT: No nasal discharge; airway clear.  NECK: Supple; non tender.  CARD: S1, S2 normal;  Regular rate and rhythm.   RESP: No wheezes, rales or rhonchi.  ABD: soft non tender, non distended, no rebound or guarding  EXT: Normal ROM.  5/5 strength in all 4 extremities   LYMPH: No acute cervical adenopathy.  NEURO: A&Ox3, CN 2-11 intact, moving all extremities, 5/5 strength, equal sensation bilaterally,. normal steady gait  PSYCH: Cooperative, appropriate.

## 2022-11-21 NOTE — ED PROVIDER NOTE - NSFOLLOWUPCLINICS_GEN_ALL_ED_FT
[facility]  ENT  [address], [address]nd [place] [address]  Phone: [phone]  Fax:   Follow Up Time: 1-3 Days

## 2022-11-21 NOTE — ED ADULT NURSE NOTE - FINAL NURSING ELECTRONIC SIGNATURE
[address] [facility]  [address] [address]  [address] [address]  Dept: [phone]  Dept Fax: [phone]: [phone]    Daily Altamirano is a 49 y.o. female who presents today for her medical conditions/complaints as noted below. Tianna  is c/o of Fatigue (has noticed being more tired recently) and Hypotension (states her bottom number has been running in the 50's to low 60's)        HPI:     HPI   Chief Complaint   Patient presents with    Fatigue     has noticed being more tired recently    Hypotension     states her bottom number has been running in the 50's to low 60's     Patient presents today for evaluation of hypotension. She states blood pressure has been running low at home. She is currently taking maxide. She also has stopped metformin-- she states she is unsure why this was discontinued at one of her hospital admissions but blood sugars have been high ever since this change. She is currently on both basal and meal time insulin. She is seeing wound care for chronic lower extremity cellulitis. She states she is noticing improvement with change in dressings recently. She also continues to see Dr Jayashree Salguero.      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    KNEE CARTILAGE SURGERY Left 12/20/2016    KNEE ARTHROSCOPIC PARTIAL MEDIAL MENISCECTOMY performed by Judith George MD at [address] [place] [address] 6/28/2021 6/16/2021 5/20/2021 5/20/2021 5/20/2021 7/12/2293   SYSTOLIC 142 352 694 544 103 179   DIASTOLIC 76 89 90 80 90 89   Pulse 81 94 92 81 91 90   Temp 97.3 97.3 - 97 97.3 97.8   Resp 16 18 18 18 20 18   SpO2 98 96 98 99 96 96   Weight 331 lb 12.8 oz 319 lb 6.4 oz - - - -   Height 5' 1\" 5' 1\" - - - -   Body mass index 62.69 kg/m2 60.34 kg/m2 - - - -   Pain Level - - - - - -   Some recent data might be hidden       Family History   Problem Relation Age of Onset    Obesity Mother     Arthritis Mother         has had knees replaced    Cancer Father         \"adrenal gland & lung\"    Parkinsonism Father     Cancer Maternal Grandmother     COPD Maternal Grandmother     Obesity Maternal Grandmother     Heart Failure Maternal Grandmother     Cancer Maternal Grandfather     Other Sister         spina biffida    No Known Problems Son     No Known Problems Son     No Known Problems Daughter     Obesity Maternal Aunt     Other Maternal Aunt          of sepsis related to a spider bite       Social History     Tobacco Use    Smoking status: Never Smoker    Smokeless tobacco: Never Used   Substance Use Topics    Alcohol use: Not Currently     Comment: \"socially, not recently though\"      Current Outpatient Medications on File Prior to Visit   Medication Sig Dispense Refill    doxycycline hyclate (VIBRAMYCIN) 100 MG capsule Take 100 mg by mouth 2 times daily      fluticasone (FLONASE) 50 MCG/ACT nasal spray SHAKE LIQUID AND USE 1 SPRAY IN EACH NOSTRIL DAILY 16 g 0    furosemide (LASIX) 80 MG tablet Take 0.5 tablets by mouth 2 times daily 60 tablet 0    triamterene-hydroCHLOROthiazide (MAXZIDE-25) 37.5-25 MG per tablet TAKE 1 TABLET BY MOUTH DAILY 30 tablet 2    DULoxetine (CYMBALTA) 60 MG extended release capsule TAKE 1 CAPSULE BY MOUTH TWICE DAILY 30 capsule 5    insulin lispro, 1 Unit Dial, (ADMELOG SOLOSTAR) 100 UNIT/ML SOPN ADMINISTER 60 UNITS  EVERY EVENING (Patient taking differently: Sliding scale) 18 mL 5    Liraglutide (VICTOZA) 18 MG/3ML SOPN SC injection ADMINISTER 1.8 MG INTO THE SKIN EVERY DAY 9 mL 3    busPIRone (BUSPAR) 10 MG tablet TAKE 1 TABLET BY MOUTH THREE TIMES DAILY 90 tablet 3    pramipexole (MIRAPEX) 0.75 MG tablet TAKE 1 TABLET BY MOUTH TWICE DAILY 60 tablet 5    blood glucose test strips (ASCENSIA AUTODISC VI;ONE TOUCH ULTRA TEST VI) strip Check glucose TID and as needed for hypoglycemic events Dx E11.40 E11.66 Z79.4  Use one touch verio flex 150 each 5    omeprazole (PRILOSEC) 20 MG delayed release capsule TAKE ONE CAPSULE BY MOUTH TWICE DAILY BEFORE MEALS 180 capsule 1    Diabetic Shoe MISC by Does not apply route DISPENSE ONE PAIR OF DIABETIC SHOE AND 3 PAIRS HEAT MOLDED INSERTS 1 each 0    potassium chloride (KLOR-CON M) 20 MEQ extended release tablet Take 1 tablet by mouth daily (Patient taking differently: Take 20 mEq by mouth daily as needed ) 30 tablet 0    mirtazapine (REMERON) 15 MG tablet TAKE 1 TABLET BY MOUTH EVERY NIGHT 30 tablet 3    BASAGLAR KWIKPEN 100 UNIT/ML injection pen INJECT 30 UNITS INTO THE SKIN EVERY EVENING 15 mL 1    oxyCODONE-acetaminophen (PERCOCET) 7.5-325 MG per tablet Take 1 tablet by mouth 2 times daily.  tiZANidine (ZANAFLEX) 4 MG tablet Take 4 mg by mouth daily as needed      NONFORMULARY Prednisone eye drops 1 drop in each eye daily      Morphine Sulfate ER 15 MG T12A Take 15 mg by mouth 2 times daily.  Cream Base CREA APPLY ONE PUMP (GRAM) TO AFFECTED AREA(S) THREE TO FOUR TIMES A DAY TO THE APPENDAGES AND TRUNK AS DIRECTED 30 g 5    celecoxib (CELEBREX) 200 MG capsule TAKE 1 CAPSULE BY MOUTH EVERY DAY 60 capsule 5    B-D ULTRAFINE III SHORT PEN 31G X 8 MM MISC USE TO INJECT INSULIN ONCE DAILY 100 each 3    Lancets (ONETOUCH DELICA PLUS [id number]) MISC USE TO CHECK BLLOD SUGAR AS DIRECTED 100 each 1    blood glucose monitor kit and supplies Test 1 times a day & as needed for symptoms of irregular blood glucose. 1 kit 0    gabapentin (NEURONTIN) 300 MG capsule Take 300 mg by mouth 3 times daily.    5    Blood Glucose Monitoring Suppl ([address]) w/Device KIT 1 kit by Does not apply route daily as needed (Dx 250.00) 1 kit 0    ONE TOUCH LANCETS MISC 1 each by Does not apply route daily 100 each 3    clonazePAM (KLONOPIN) 0.5 MG tablet Take 1 tablet by mouth 2 times daily as needed for Anxiety for up to 30 days. 45 tablet 0    Butorphanol Tartrate (STADOL NS NA) 1 spray by Nasal route 4 times daily as needed (migraines) (Patient not taking: Reported on 6/28/2021)      [DISCONTINUED] Diabetic Shoe MISC by Does not apply route DISPENSE ONE PAIR OF DIABETIC SHOE AND 3 PAIRS HEAT MOLDED INSERTS 1 each 0    rizatriptan (MAXALT) 10 MG tablet Take 1 tablet by mouth once as needed for Migraine May repeat in 2 hours if needed 30 tablet 3     No current facility-administered medications on file prior to visit.      Allergies   Allergen Reactions    Compazine [Prochlorperazine] Shortness Of Breath    Ciprofloxacin Hives    Prochlorperazine Edisylate      Will discuss with patient at next visit     Tobramycin Other (See Comments)     States had corneal transplants - and was told not to    Tramadol      Will discuss with patient at next visit     Amoxicillin-Pot Clavulanate Hives, Itching and Rash    Ancef [Cefazolin] Nausea And Vomiting    Bactrim [Sulfamethoxazole-Trimethoprim] Nausea And Vomiting and Other (See Comments)     States she also gets yeast infections with it    Cephalexin Rash    Clindamycin/Lincomycin Rash    Codeine Nausea And Vomiting and Rash    Demerol Hives    Doxycycline Nausea And Vomiting    Hydroxyzine Hcl Itching    Ibuprofen Nausea Only    Keflex [Cephalexin] Rash    Meperidine Hives    Moxifloxacin Nausea And Vomiting    Nortriptyline Hives, Itching and Rash    Orphenadrine Citrate Itching    Penicillins Hives and Nausea Only    Vistaril [Hydroxyzine Hcl] Itching       Health Maintenance   Topic Date Due    Hepatitis C screen  Never done    Diabetic retinal exam  Never done  COVID-19 Vaccine (1) Never done    HIV screen  Never done    Hepatitis B vaccine (1 of 3 - Risk 3-dose series) Never done    Cervical cancer screen  05/21/2017    Diabetic microalbuminuria test  12/17/2019    Flu vaccine (Season Ended) 01/20/2022 (Originally 9/1/2021)    A1C test (Diabetic or Prediabetic)  08/03/2021    Lipid screen  08/17/2021    DTaP/Tdap/Td vaccine (2 - Td or Tdap) 09/22/2021    Diabetic foot exam  01/20/2022    Potassium monitoring  05/03/2022    Creatinine monitoring  05/03/2022    Pneumococcal 0-64 years Vaccine (2 of 2 - PPSV23) 09/08/2037    Hepatitis A vaccine  Aged Out    Hib vaccine  Aged Out    Meningococcal (ACWY) vaccine  Aged Out       Subjective:      Review of Systems   Constitutional: Negative for chills and fever. HENT: Negative for ear pain, hearing loss, rhinorrhea and voice change. Eyes: Negative for photophobia and visual disturbance. Respiratory: Negative for cough and shortness of breath. Cardiovascular: Negative for chest pain and palpitations. Gastrointestinal: Negative for nausea and vomiting. Endocrine: Negative. Negative for cold intolerance and heat intolerance. Genitourinary: Negative for difficulty urinating and flank pain. Musculoskeletal: Negative for back pain and neck pain. Skin: Negative for color change and rash. Allergic/Immunologic: Negative for environmental allergies and food allergies. Neurological: Negative for dizziness, speech difficulty and headaches. Hematological: Does not bruise/bleed easily. Psychiatric/Behavioral: Negative for sleep disturbance and suicidal ideas. Objective:     Physical Exam  Vitals and nursing note reviewed. Constitutional:       Appearance: She is well-developed. She is obese. HENT:      Head: Atraumatic.       Right Ear: External ear normal.      Left Ear: External ear normal.      Nose: Nose normal.   Eyes:      Conjunctiva/sclera: Conjunctivae normal.      Pupils: Pupils are equal, round, and reactive to light. Cardiovascular:      Rate and Rhythm: Normal rate and regular rhythm. Heart sounds: Normal heart sounds, S1 normal and S2 normal.   Pulmonary:      Effort: Pulmonary effort is normal.      Breath sounds: Normal breath sounds. Abdominal:      General: Bowel sounds are normal.      Palpations: Abdomen is soft. Musculoskeletal:         General: Normal range of motion. Cervical back: Normal range of motion and neck supple. Right lower leg: Edema present. Left lower leg: Edema present. Skin:     General: Skin is warm and dry. Neurological:      Mental Status: She is alert and oriented to person, place, and time. Psychiatric:         Behavior: Behavior normal.       /76   Pulse 81   Temp 97.3 °F (36.3 °C) (Temporal)   Resp 16   Ht 5' 1\" (1.549 m)   Wt (!) 331 lb 12.8 oz (150.5 kg)   SpO2 98%   BMI 62.69 kg/m²     Assessment:       Diagnosis Orders   1. Type 2 diabetes mellitus without complication, with long-term current use of insulin ([facility])  CBC Auto Differential    Comprehensive Metabolic Panel   2. Essential hypertension  CBC Auto Differential    Comprehensive Metabolic Panel         Plan:   More than 50% of the time was spent counseling and coordinating care for a total time of 30 min face to face. Resume metformin 500 mg BID; may gradually increase as tolerated. Obtain fasting labs. Monitor blood pressure; if continuing to be lower at home would advise she bring cuff with her to appt. Okay to half or hold maxide for BP <100/60. Patient given educational materials -see patient instructions. Discussed use, benefit, and side effects of prescribed medications. All patient questions answered. Pt voiced understanding. Reviewed health maintenance. Instructed to continue currentmedications, diet and exercise. Patient agreed with treatment plan. Follow up as directed.      MEDICATIONS:  Orders Placed This Encounter Medications    ondansetron (ZOFRAN-ODT) 4 MG disintegrating tablet     Sig: DISSOLVE 1 TABLET UNDER THE TONGUE EVERY 8 HOURS AS NEEDED FOR NAUSEA AND VOMITING(TAKE PRIOR TO DOXYCYLINE DOSES)     Dispense:  20 tablet     Refill:  0    metFORMIN (GLUCOPHAGE) 500 MG tablet     Sig: Take 1 tablet by mouth 2 times daily (with meals)     Dispense:  180 tablet     Refill:  1         ORDERS:  Orders Placed This Encounter   Procedures    CBC Auto Differential    Comprehensive Metabolic Panel       Follow-up:  Return in about 3 months (around 9/28/2021) for in office. PATIENT INSTRUCTIONS:  Patient Instructions   We are committed to providing you with the best care possible. In order to help us achieve these goals please remember to bring all medications, herbal products, and over the counter supplements with you to each visit. If your provider has ordered testing for you, please be sure to follow up with our office if you have not received results within 7 days after the testing took place. *If you receive a survey after visiting one of our offices, please take time to share your experience concerning your physician office visit. These surveys are confidential and no health information about you is shared. We are eager to improve for you and we are counting on your feedback to help make that happen. Electronically signed by NANETTE Kaur on 6/28/2021 at 1:51 PM    EMR Dragon/transcription disclaimer:  Much of thisencounter note is electronic transcription/translation of spoken language to printed texts. The electronic translation of spoken language may be erroneous, or at times, nonsensical words or phrases may be inadvertentlytranscribed.   Although I have reviewed the note for such errors, some may still exist. 21-Nov-2022 21:07

## 2022-11-21 NOTE — CONSULT NOTE ADULT - SUBJECTIVE AND OBJECTIVE BOX
Patient is a 56y old  Female who presents with a chief complaint of left dental pain    HPI:   Patient had been in pain for past few days. Patient report dental work 3 months ago and #10 become sensitivity. Pain and feeling of numbness started few days ago.    PAST MEDICAL & SURGICAL HISTORY:  Anxiety and depression      H/O  section        ( -  ) heart valve replacement  ( - ) joint replacement  ( -  ) pregnancy    MEDICATIONS  (STANDING):    MEDICATIONS  (PRN):      Allergies    No Known Allergies    Intolerances        FAMILY HISTORY:      *Last Dental Visit: unknown    Vital Signs Last 24 Hrs  T(C): 35.9 (2022 18:11), Max: 35.9 (2022 18:11)  T(F): 96.6 (2022 18:11), Max: 96.6 (2022 18:11)  HR: 77 (2022 18:11) (77 - 77)  BP: 126/60 (2022 18:11) (126/60 - 126/60)  BP(mean): --  RR: 18 (2022 18:11) (18 - 18)  SpO2: 100% (2022 18:11) (100% - 100%)    Parameters below as of 2022 18:11  Patient On (Oxygen Delivery Method): room air        LABS:                  EOE:  TMJ (  - ) clicks                     ( -  ) pops                     (  - ) crepitus             Mandible <<FROM>>             Facial bones and MOM <<grossly intact>>             ( -  ) trismus             ( -  ) lymphadenopathy             (  - ) swelling             ( -  ) asymmetry             ( +  ) palpation             ( -  ) dyspnea             ( -  ) dysphagia             ( -  ) loss of consciousness  ,Left upper lip area tender on palpation, no swelling noted.  IOE:  <<permanent >> dentition:   <<multiple missing teeth>>           hard/soft palate:  (  - ) palatal torus, <<No pathology noted>>           tongue/FOM <<No pathology noted>>           labial/buccal mucosa <<No pathology noted>>           ( +  ) percussion           ( + ) palpation           (  - ) swelling            (  - ) abscess           ( -  ) sinus tract  #9, #10 tender on percussion and palpation. Existing restoration noted lingual of #10.   Dentition present: << y  >>  Mobility: << n >>  Caries: << y  >>       *DENTAL RADIOGRAPHS: n/a    RADIOLOGY & ADDITIONAL STUDIES:    *ASSESSMENT: Patient is a 56y old  Female who presents with a chief complaint of left dental pain. Patient had been in pain for past few days. Patient report dental work 3 months ago and #10 become sensitivity. Pain and feeling of numbness started few days ago. Left upper lip area tender on palpation, no swelling noted. #9, #10 tender on percussion and palpation. Existing restoration noted lingual of #10. Patient history and symptoms suggest acute periapical periodontitis #10.       *PLAN:  Reassured patent nerve damage unlikely. Informed patient #10 treatment needed.  PROCEDURE:   Verbal consent given.  Administered 1x Carpule Marcaine 0.5% 1:200,000 epinephrine  via buccal infiltration.       RECOMMENDATIONS:  1) Pain medication and antibiotic as per ED  2) Dental F/U with outpatient dentist for comprehensive dental care.   3) If any difficulty swallowing/breathing, fever occur, return to ER.     Marcio Villegas DDS, [id number]

## 2022-11-21 NOTE — ED PROVIDER NOTE - NSDCPRINTRESULTS_ED_ALL_ED
· Continue levothyroxine Patient requests all Lab, Cardiology, and Radiology Results on their Discharge Instructions

## 2022-11-21 NOTE — ED PROVIDER NOTE - NSFOLLOWUPINSTRUCTIONS_ED_ALL_ED_FT
Please make sure to follow up with your primary care doctor in 3 days.  Please make sure to follow up with your dentist.        Paresthesia      Paresthesia is an abnormal burning or prickling sensation. It is usually felt in the hands, arms, legs, or feet. However, it may occur in any part of the body. Usually, paresthesia is not painful. It may feel like:  •Tingling or numbness.      •Buzzing.      •Itching.      Paresthesia may occur without any clear cause, or it may be caused by:  •Breathing too quickly (hyperventilation).      •Pressure on a nerve.      •An underlying medical condition.      •Side effects of a medication.      •Nutritional deficiencies.      •Exposure to toxic chemicals.      Most people experience temporary (transient) paresthesia at some time in their lives. For some people, it may be long-lasting (chronic) because of an underlying medical condition. If you have paresthesia that lasts a long time, you need to be evaluated by your health care provider.      Follow these instructions at home:      Alcohol use    • Do not drink alcohol if:  •Your health care provider tells you not to drink.      •You are pregnant, may be pregnant, or are planning to become pregnant.      •If you drink alcohol:•Limit how much you use to:  •0–1 drink a day for women.      •0–2 drinks a day for men.        •Be aware of how much alcohol is in your drink. In the [place] one drink equals one 12 oz bottle of beer (355 mL), one 5 oz glass of wine (148 mL), or one 1½ oz glass of hard liquor (44 mL).          Nutrition    •Eat a healthy diet. This includes:  •Eating foods that are high in fiber, such as fresh fruits and vegetables, whole grains, and beans.      •Limiting foods that are high in fat and processed sugars, such as fried or sweet foods.        General instructions     •Take over-the-counter and prescription medicines only as told by your health care provider.      • Do not use any products that contain nicotine or tobacco, such as cigarettes and e-cigarettes. These can keep blood from reaching damaged nerves. If you need help quitting, ask your health care provider.      •If you have diabetes, work closely with your health care provider to keep your blood sugar under control.    •If you have numbness in your feet:  •Check every day for signs of injury or infection. Watch for redness, warmth, and swelling.      •Wear padded socks and comfortable shoes. These help protect your feet.        •Keep all follow-up visits as told by your health care provider. This is important.        Contact a health care provider if you:    •Have paresthesia that gets worse or does not go away.      •Have numbness after an injury.      •Have a burning or prickling feeling that gets worse when you walk.      •Have pain, cramps, or dizziness, or you faint.      •Develop a rash.        Get help right away if you:    •Feel muscle weakness.      •Develop new weakness in an arm or leg.      •Have trouble walking or moving.      •Have problems with speech, understanding, or vision.      •Feel confused.      •Cannot control your bladder or bowel movements.        Summary    •Paresthesia is an abnormal burning or prickling sensation that is usually felt in the hands, arms, legs, or feet. It may also occur in other parts of the body.      •Paresthesia may occur without any clear cause, or it may be caused by breathing too quickly (hyperventilation), pressure on a nerve, an underlying medical condition, side effects of a medication, nutritional deficiencies, or exposure to toxic chemicals.      •If you have paresthesia that lasts a long time, you need to be evaluated by your health care provider.      This information is not intended to replace advice given to you by your health care provider. Make sure you discuss any questions you have with your health care provider.

## 2022-11-21 NOTE — ED PROVIDER NOTE - OBJECTIVE STATEMENT
56-year-old female with a past medical history of anxiety and depression who presents with tingling sensation in her left upper lip.  Reports that symptoms started around 3 days ago and there is no alleviating or worsening factor.  Reports that she also noticed bumps in her upper lip, but denies abscess or discharge.  Also endorses dizziness that is only positional.  Denies focal weakness, slurred speech, change in vision, fever, shortness of breath, chest pain, nausea, vomit, abdominal pain, urinary symptoms, change in bowel movement.

## 2022-11-21 NOTE — ED PROVIDER NOTE - NS ED ROS FT
Constitutional: Negative for fever, chills, and fatigue.  HENT: + tingling sensation in the L upper lip. Negative for headache.  Eyes: Negative for eye pain, and vision change.  Cardiovascular: Negative for chest pain, and palpitation.  Respiratory: Negative for SOB, wheezing, cough and sputum production.  Gastrointestinal: Negative for nausea, vomiting, abdominal pain,  Genitourinary: Negative for flank pain, dysuria, frequency, and hematuria.  Neurological: + dizziness. Negative for syncope, focal weakness, numbness, and loss of consciousness.  Musculoskeletal: Negative for joint swelling, arthralgias, back pain, neck pain, and calf cramps.  Hematological: Does not bruise/bleed easily.

## 2022-11-21 NOTE — ED ADULT NURSE NOTE - NSIMPLEMENTINTERV_GEN_ALL_ED
Implemented All Universal Safety Interventions:  [place] to call system. Call bell, personal items and telephone within reach. Instruct patient to call for assistance. Room bathroom lighting operational. Non-slip footwear when patient is off stretcher. Physically safe environment: no spills, clutter or unnecessary equipment. Stretcher in lowest position, wheels locked, appropriate side rails in place.

## 2022-11-21 NOTE — ED PROVIDER NOTE - PROGRESS NOTE DETAILS
Pt has been evaluated by dental team and clear for discharge. Physical exam is benign; no concern for TIA or CVA. Meds given in the ED and symptom improved significantly. Will have pt follow up with dental and ENT op.

## 2022-11-21 NOTE — ED PROVIDER NOTE - CLINICAL SUMMARY MEDICAL DECISION MAKING FREE TEXT BOX
Patient presents with tingling sensation to V2 distribution.  Normal neurologic exam.  Sensation intact.  Positive leftward nystagmus on exam.  Meclizine given.  Dental consulted and found.  Periapical periodontitis.  Dental block given.  Patient discharged with antibiotics and dental follow-up.  Return precautions discussed.

## 2022-11-21 NOTE — ED PROVIDER NOTE - PATIENT PORTAL LINK FT
You can access the FollowMyHealth Patient Portal offered by [facility] by registering at the following website: [email or web address]. By joining [facility]’s FollowMyHealth portal, you will also be able to view your health information using other applications (apps) compatible with our system.

## 2023-12-29 ENCOUNTER — INPATIENT (INPATIENT)
Facility: HOSPITAL | Age: 58
LOS: 1 days | Discharge: HOME | DRG: 253 | End: 2023-12-31
Attending: INTERNAL MEDICINE | Admitting: FAMILY MEDICINE
Payer: MEDICAID

## 2023-12-29 VITALS
RESPIRATION RATE: 18 BRPM | TEMPERATURE: 98 F | SYSTOLIC BLOOD PRESSURE: 144 MMHG | OXYGEN SATURATION: 99 % | DIASTOLIC BLOOD PRESSURE: 74 MMHG | HEART RATE: 111 BPM | WEIGHT: 111.99 LBS

## 2023-12-29 DIAGNOSIS — K92.2 GASTROINTESTINAL HEMORRHAGE, UNSPECIFIED: ICD-10-CM

## 2023-12-29 DIAGNOSIS — R91.1 SOLITARY PULMONARY NODULE: ICD-10-CM

## 2023-12-29 DIAGNOSIS — Z98.891 HISTORY OF UTERINE SCAR FROM PREVIOUS SURGERY: Chronic | ICD-10-CM

## 2023-12-29 DIAGNOSIS — Z72.0 TOBACCO USE: ICD-10-CM

## 2023-12-29 LAB
ALBUMIN SERPL ELPH-MCNC: 4.1 G/DL — SIGNIFICANT CHANGE UP (ref 3.5–5.2)
ALBUMIN SERPL ELPH-MCNC: 4.1 G/DL — SIGNIFICANT CHANGE UP (ref 3.5–5.2)
ALP SERPL-CCNC: 68 U/L — SIGNIFICANT CHANGE UP (ref 30–115)
ALP SERPL-CCNC: 68 U/L — SIGNIFICANT CHANGE UP (ref 30–115)
ALT FLD-CCNC: 8 U/L — SIGNIFICANT CHANGE UP (ref 0–41)
ALT FLD-CCNC: 8 U/L — SIGNIFICANT CHANGE UP (ref 0–41)
ANION GAP SERPL CALC-SCNC: 9 MMOL/L — SIGNIFICANT CHANGE UP (ref 7–14)
ANION GAP SERPL CALC-SCNC: 9 MMOL/L — SIGNIFICANT CHANGE UP (ref 7–14)
APTT BLD: 31.4 SEC — SIGNIFICANT CHANGE UP (ref 27–39.2)
APTT BLD: 31.4 SEC — SIGNIFICANT CHANGE UP (ref 27–39.2)
AST SERPL-CCNC: 17 U/L — SIGNIFICANT CHANGE UP (ref 0–41)
AST SERPL-CCNC: 17 U/L — SIGNIFICANT CHANGE UP (ref 0–41)
BASOPHILS # BLD AUTO: 0.06 K/UL — SIGNIFICANT CHANGE UP (ref 0–0.2)
BASOPHILS # BLD AUTO: 0.06 K/UL — SIGNIFICANT CHANGE UP (ref 0–0.2)
BASOPHILS NFR BLD AUTO: 0.6 % — SIGNIFICANT CHANGE UP (ref 0–1)
BASOPHILS NFR BLD AUTO: 0.6 % — SIGNIFICANT CHANGE UP (ref 0–1)
BILIRUB SERPL-MCNC: <0.2 MG/DL — SIGNIFICANT CHANGE UP (ref 0.2–1.2)
BILIRUB SERPL-MCNC: <0.2 MG/DL — SIGNIFICANT CHANGE UP (ref 0.2–1.2)
BLD GP AB SCN SERPL QL: SIGNIFICANT CHANGE UP
BLD GP AB SCN SERPL QL: SIGNIFICANT CHANGE UP
BUN SERPL-MCNC: 30 MG/DL — HIGH (ref 10–20)
BUN SERPL-MCNC: 30 MG/DL — HIGH (ref 10–20)
CALCIUM SERPL-MCNC: 9.3 MG/DL — SIGNIFICANT CHANGE UP (ref 8.4–10.5)
CALCIUM SERPL-MCNC: 9.3 MG/DL — SIGNIFICANT CHANGE UP (ref 8.4–10.5)
CHLORIDE SERPL-SCNC: 108 MMOL/L — SIGNIFICANT CHANGE UP (ref 98–110)
CHLORIDE SERPL-SCNC: 108 MMOL/L — SIGNIFICANT CHANGE UP (ref 98–110)
CO2 SERPL-SCNC: 25 MMOL/L — SIGNIFICANT CHANGE UP (ref 17–32)
CO2 SERPL-SCNC: 25 MMOL/L — SIGNIFICANT CHANGE UP (ref 17–32)
CREAT SERPL-MCNC: 0.6 MG/DL — LOW (ref 0.7–1.5)
CREAT SERPL-MCNC: 0.6 MG/DL — LOW (ref 0.7–1.5)
EGFR: 103 ML/MIN/1.73M2 — SIGNIFICANT CHANGE UP
EGFR: 103 ML/MIN/1.73M2 — SIGNIFICANT CHANGE UP
EOSINOPHIL # BLD AUTO: 0.09 K/UL — SIGNIFICANT CHANGE UP (ref 0–0.7)
EOSINOPHIL # BLD AUTO: 0.09 K/UL — SIGNIFICANT CHANGE UP (ref 0–0.7)
EOSINOPHIL NFR BLD AUTO: 1 % — SIGNIFICANT CHANGE UP (ref 0–8)
EOSINOPHIL NFR BLD AUTO: 1 % — SIGNIFICANT CHANGE UP (ref 0–8)
GLUCOSE SERPL-MCNC: 113 MG/DL — HIGH (ref 70–99)
GLUCOSE SERPL-MCNC: 113 MG/DL — HIGH (ref 70–99)
HCT VFR BLD CALC: 26.6 % — LOW (ref 37–47)
HCT VFR BLD CALC: 26.6 % — LOW (ref 37–47)
HCT VFR BLD CALC: 29.5 % — LOW (ref 37–47)
HCT VFR BLD CALC: 29.5 % — LOW (ref 37–47)
HGB BLD-MCNC: 10 G/DL — LOW (ref 12–16)
HGB BLD-MCNC: 10 G/DL — LOW (ref 12–16)
HGB BLD-MCNC: 9.1 G/DL — LOW (ref 12–16)
HGB BLD-MCNC: 9.1 G/DL — LOW (ref 12–16)
IMM GRANULOCYTES NFR BLD AUTO: 0.3 % — SIGNIFICANT CHANGE UP (ref 0.1–0.3)
IMM GRANULOCYTES NFR BLD AUTO: 0.3 % — SIGNIFICANT CHANGE UP (ref 0.1–0.3)
INR BLD: 1.01 RATIO — SIGNIFICANT CHANGE UP (ref 0.65–1.3)
INR BLD: 1.01 RATIO — SIGNIFICANT CHANGE UP (ref 0.65–1.3)
LACTATE SERPL-SCNC: 1.5 MMOL/L — SIGNIFICANT CHANGE UP (ref 0.7–2)
LACTATE SERPL-SCNC: 1.5 MMOL/L — SIGNIFICANT CHANGE UP (ref 0.7–2)
LIDOCAIN IGE QN: 26 U/L — SIGNIFICANT CHANGE UP (ref 7–60)
LIDOCAIN IGE QN: 26 U/L — SIGNIFICANT CHANGE UP (ref 7–60)
LYMPHOCYTES # BLD AUTO: 2.87 K/UL — SIGNIFICANT CHANGE UP (ref 1.2–3.4)
LYMPHOCYTES # BLD AUTO: 2.87 K/UL — SIGNIFICANT CHANGE UP (ref 1.2–3.4)
LYMPHOCYTES # BLD AUTO: 30.4 % — SIGNIFICANT CHANGE UP (ref 20.5–51.1)
LYMPHOCYTES # BLD AUTO: 30.4 % — SIGNIFICANT CHANGE UP (ref 20.5–51.1)
MCHC RBC-ENTMCNC: 32.6 PG — HIGH (ref 27–31)
MCHC RBC-ENTMCNC: 32.6 PG — HIGH (ref 27–31)
MCHC RBC-ENTMCNC: 32.9 PG — HIGH (ref 27–31)
MCHC RBC-ENTMCNC: 32.9 PG — HIGH (ref 27–31)
MCHC RBC-ENTMCNC: 33.9 G/DL — SIGNIFICANT CHANGE UP (ref 32–37)
MCHC RBC-ENTMCNC: 33.9 G/DL — SIGNIFICANT CHANGE UP (ref 32–37)
MCHC RBC-ENTMCNC: 34.2 G/DL — SIGNIFICANT CHANGE UP (ref 32–37)
MCHC RBC-ENTMCNC: 34.2 G/DL — SIGNIFICANT CHANGE UP (ref 32–37)
MCV RBC AUTO: 96 FL — SIGNIFICANT CHANGE UP (ref 81–99)
MCV RBC AUTO: 96 FL — SIGNIFICANT CHANGE UP (ref 81–99)
MCV RBC AUTO: 96.1 FL — SIGNIFICANT CHANGE UP (ref 81–99)
MCV RBC AUTO: 96.1 FL — SIGNIFICANT CHANGE UP (ref 81–99)
MONOCYTES # BLD AUTO: 0.65 K/UL — HIGH (ref 0.1–0.6)
MONOCYTES # BLD AUTO: 0.65 K/UL — HIGH (ref 0.1–0.6)
MONOCYTES NFR BLD AUTO: 6.9 % — SIGNIFICANT CHANGE UP (ref 1.7–9.3)
MONOCYTES NFR BLD AUTO: 6.9 % — SIGNIFICANT CHANGE UP (ref 1.7–9.3)
NEUTROPHILS # BLD AUTO: 5.73 K/UL — SIGNIFICANT CHANGE UP (ref 1.4–6.5)
NEUTROPHILS # BLD AUTO: 5.73 K/UL — SIGNIFICANT CHANGE UP (ref 1.4–6.5)
NEUTROPHILS NFR BLD AUTO: 60.8 % — SIGNIFICANT CHANGE UP (ref 42.2–75.2)
NEUTROPHILS NFR BLD AUTO: 60.8 % — SIGNIFICANT CHANGE UP (ref 42.2–75.2)
NRBC # BLD: 0 /100 WBCS — SIGNIFICANT CHANGE UP (ref 0–0)
PLATELET # BLD AUTO: 244 K/UL — SIGNIFICANT CHANGE UP (ref 130–400)
PLATELET # BLD AUTO: 244 K/UL — SIGNIFICANT CHANGE UP (ref 130–400)
PLATELET # BLD AUTO: 261 K/UL — SIGNIFICANT CHANGE UP (ref 130–400)
PLATELET # BLD AUTO: 261 K/UL — SIGNIFICANT CHANGE UP (ref 130–400)
PMV BLD: 10 FL — SIGNIFICANT CHANGE UP (ref 7.4–10.4)
PMV BLD: 10 FL — SIGNIFICANT CHANGE UP (ref 7.4–10.4)
PMV BLD: 10.5 FL — HIGH (ref 7.4–10.4)
PMV BLD: 10.5 FL — HIGH (ref 7.4–10.4)
POTASSIUM SERPL-MCNC: 4.7 MMOL/L — SIGNIFICANT CHANGE UP (ref 3.5–5)
POTASSIUM SERPL-MCNC: 4.7 MMOL/L — SIGNIFICANT CHANGE UP (ref 3.5–5)
POTASSIUM SERPL-SCNC: 4.7 MMOL/L — SIGNIFICANT CHANGE UP (ref 3.5–5)
POTASSIUM SERPL-SCNC: 4.7 MMOL/L — SIGNIFICANT CHANGE UP (ref 3.5–5)
PROT SERPL-MCNC: 6.5 G/DL — SIGNIFICANT CHANGE UP (ref 6–8)
PROT SERPL-MCNC: 6.5 G/DL — SIGNIFICANT CHANGE UP (ref 6–8)
PROTHROM AB SERPL-ACNC: 11.5 SEC — SIGNIFICANT CHANGE UP (ref 9.95–12.87)
PROTHROM AB SERPL-ACNC: 11.5 SEC — SIGNIFICANT CHANGE UP (ref 9.95–12.87)
RBC # BLD: 2.77 M/UL — LOW (ref 4.2–5.4)
RBC # BLD: 2.77 M/UL — LOW (ref 4.2–5.4)
RBC # BLD: 3.07 M/UL — LOW (ref 4.2–5.4)
RBC # BLD: 3.07 M/UL — LOW (ref 4.2–5.4)
RBC # FLD: 12.6 % — SIGNIFICANT CHANGE UP (ref 11.5–14.5)
RBC # FLD: 12.6 % — SIGNIFICANT CHANGE UP (ref 11.5–14.5)
RBC # FLD: 12.7 % — SIGNIFICANT CHANGE UP (ref 11.5–14.5)
RBC # FLD: 12.7 % — SIGNIFICANT CHANGE UP (ref 11.5–14.5)
SODIUM SERPL-SCNC: 142 MMOL/L — SIGNIFICANT CHANGE UP (ref 135–146)
SODIUM SERPL-SCNC: 142 MMOL/L — SIGNIFICANT CHANGE UP (ref 135–146)
WBC # BLD: 8.64 K/UL — SIGNIFICANT CHANGE UP (ref 4.8–10.8)
WBC # BLD: 8.64 K/UL — SIGNIFICANT CHANGE UP (ref 4.8–10.8)
WBC # BLD: 9.43 K/UL — SIGNIFICANT CHANGE UP (ref 4.8–10.8)
WBC # BLD: 9.43 K/UL — SIGNIFICANT CHANGE UP (ref 4.8–10.8)
WBC # FLD AUTO: 8.64 K/UL — SIGNIFICANT CHANGE UP (ref 4.8–10.8)
WBC # FLD AUTO: 8.64 K/UL — SIGNIFICANT CHANGE UP (ref 4.8–10.8)
WBC # FLD AUTO: 9.43 K/UL — SIGNIFICANT CHANGE UP (ref 4.8–10.8)
WBC # FLD AUTO: 9.43 K/UL — SIGNIFICANT CHANGE UP (ref 4.8–10.8)

## 2023-12-29 PROCEDURE — 80048 BASIC METABOLIC PNL TOTAL CA: CPT

## 2023-12-29 PROCEDURE — 85027 COMPLETE CBC AUTOMATED: CPT

## 2023-12-29 PROCEDURE — 80053 COMPREHEN METABOLIC PANEL: CPT

## 2023-12-29 PROCEDURE — 71250 CT THORAX DX C-: CPT

## 2023-12-29 PROCEDURE — 74177 CT ABD & PELVIS W/CONTRAST: CPT | Mod: 26,MA

## 2023-12-29 PROCEDURE — 99291 CRITICAL CARE FIRST HOUR: CPT

## 2023-12-29 PROCEDURE — 99223 1ST HOSP IP/OBS HIGH 75: CPT

## 2023-12-29 PROCEDURE — 99253 IP/OBS CNSLTJ NEW/EST LOW 45: CPT

## 2023-12-29 PROCEDURE — 36415 COLL VENOUS BLD VENIPUNCTURE: CPT

## 2023-12-29 RX ORDER — DIAZEPAM 5 MG
1 TABLET ORAL
Qty: 0 | Refills: 0 | DISCHARGE

## 2023-12-29 RX ORDER — DULOXETINE HYDROCHLORIDE 30 MG/1
30 CAPSULE, DELAYED RELEASE ORAL DAILY
Refills: 0 | Status: DISCONTINUED | OUTPATIENT
Start: 2023-12-29 | End: 2023-12-31

## 2023-12-29 RX ORDER — ONDANSETRON 8 MG/1
4 TABLET, FILM COATED ORAL EVERY 8 HOURS
Refills: 0 | Status: DISCONTINUED | OUTPATIENT
Start: 2023-12-29 | End: 2023-12-31

## 2023-12-29 RX ORDER — NICOTINE POLACRILEX 2 MG
1 GUM BUCCAL DAILY
Refills: 0 | Status: DISCONTINUED | OUTPATIENT
Start: 2023-12-29 | End: 2023-12-31

## 2023-12-29 RX ORDER — SERTRALINE 25 MG/1
1 TABLET, FILM COATED ORAL
Qty: 0 | Refills: 0 | DISCHARGE

## 2023-12-29 RX ORDER — ESOMEPRAZOLE MAGNESIUM 40 MG/1
1 CAPSULE, DELAYED RELEASE ORAL
Qty: 0 | Refills: 0 | DISCHARGE

## 2023-12-29 RX ORDER — DIAZEPAM 5 MG
10 TABLET ORAL EVERY 12 HOURS
Refills: 0 | Status: DISCONTINUED | OUTPATIENT
Start: 2023-12-29 | End: 2023-12-31

## 2023-12-29 RX ORDER — ONDANSETRON 8 MG/1
4 TABLET, FILM COATED ORAL ONCE
Refills: 0 | Status: COMPLETED | OUTPATIENT
Start: 2023-12-29 | End: 2023-12-29

## 2023-12-29 RX ORDER — PANTOPRAZOLE SODIUM 20 MG/1
8 TABLET, DELAYED RELEASE ORAL
Qty: 80 | Refills: 0 | Status: DISCONTINUED | OUTPATIENT
Start: 2023-12-29 | End: 2023-12-31

## 2023-12-29 RX ORDER — SODIUM CHLORIDE 9 MG/ML
1000 INJECTION INTRAMUSCULAR; INTRAVENOUS; SUBCUTANEOUS
Refills: 0 | Status: DISCONTINUED | OUTPATIENT
Start: 2023-12-29 | End: 2023-12-31

## 2023-12-29 RX ORDER — SODIUM CHLORIDE 9 MG/ML
1000 INJECTION, SOLUTION INTRAVENOUS ONCE
Refills: 0 | Status: COMPLETED | OUTPATIENT
Start: 2023-12-29 | End: 2023-12-29

## 2023-12-29 RX ORDER — ACETAMINOPHEN 500 MG
650 TABLET ORAL EVERY 6 HOURS
Refills: 0 | Status: DISCONTINUED | OUTPATIENT
Start: 2023-12-29 | End: 2023-12-31

## 2023-12-29 RX ORDER — PANTOPRAZOLE SODIUM 20 MG/1
80 TABLET, DELAYED RELEASE ORAL ONCE
Refills: 0 | Status: COMPLETED | OUTPATIENT
Start: 2023-12-29 | End: 2023-12-29

## 2023-12-29 RX ADMIN — Medication 10 MILLIGRAM(S): at 23:20

## 2023-12-29 RX ADMIN — SODIUM CHLORIDE 1000 MILLILITER(S): 9 INJECTION, SOLUTION INTRAVENOUS at 18:01

## 2023-12-29 RX ADMIN — SODIUM CHLORIDE 75 MILLILITER(S): 9 INJECTION INTRAMUSCULAR; INTRAVENOUS; SUBCUTANEOUS at 23:21

## 2023-12-29 RX ADMIN — PANTOPRAZOLE SODIUM 80 MILLIGRAM(S): 20 TABLET, DELAYED RELEASE ORAL at 18:02

## 2023-12-29 RX ADMIN — DULOXETINE HYDROCHLORIDE 30 MILLIGRAM(S): 30 CAPSULE, DELAYED RELEASE ORAL at 23:21

## 2023-12-29 RX ADMIN — PANTOPRAZOLE SODIUM 10 MG/HR: 20 TABLET, DELAYED RELEASE ORAL at 20:08

## 2023-12-29 RX ADMIN — ONDANSETRON 4 MILLIGRAM(S): 8 TABLET, FILM COATED ORAL at 19:43

## 2023-12-29 NOTE — ED PROVIDER NOTE - ATTENDING APP SHARED VISIT CONTRIBUTION OF CARE
I personally evaluated patient. I agree with the findings and plan with all documentation on chart except as documented  in my note.    58-year-old female past medical history of GERD, anxiety, depression who presents to the emergency department with weakness, abdominal pain, black vomiting and stool.  Patient has not felt well over the past several days and reports a lot of weakness.  Patient took Pepto-Bismol last night due to an upset stomach and woke up today and was very nauseous and did not feel right.  After dropping grand child off to school she returned home and vomited black.  Patient later had black diarrhea.  Patient reports having endoscopy and colonoscopy with Dr. Rawls a few years ago.  Patient denies any cough, congestion, chest pain, shortness of breath.  Patient denies any urinary symptoms.  Patient reports sharp abdominal pain in her periumbilical area, left and right lower quadrants.    On exam, vital signs reviewed.  Patient appears mildly dehydrated and is tachycardic.  Patient is afebrile.  Patient has normal heart and lung exams other than fast heart rate.  Patient has a soft abdomen with tenderness to epigastric/periumbilical and lower quadrant on both right and left side.  No CVA tenderness.  Large-bore IV access obtained and full lab sent and will get CT scan of the abdomen pelvis, urine studies.  Will give IV Protonix and IV fluid resuscitation.  Will consult GI and follow-up hemoglobin and hematocrit and compared to prior values.

## 2023-12-29 NOTE — H&P ADULT - ASSESSMENT
58-year-old female past medical history of GERD, anemia, anxiety, depression  presented to the emergency department with weakness, abdominal pain, black vomiting and stool. 58-year-old female past medical history of GERD, anemia, anxiety, depression  presented to the emergency department with weakness, abdominal pain, black vomiting and stool.    # Upper GI bleed  - Pt seen by critical care  - c/w protonic infusion  - CBC Q8H  - CLD  - IVF  - avoid NSAIDS  - GI consult r/o GIB  - keep bed elevated 45 degrees  - zofran  - repeat labs    # Nicotine dependency  - nicotine patch    # Anxiety  - c/w valium    # Depression  - c/w Duloxetin  58-year-old female past medical history of GERD, anemia, anxiety, depression  presented to the emergency department with weakness, abdominal pain, black vomiting and stool.    # Upper GI bleed  - Hg trending down from 10 - 9.1  - Pt seen by critical care  - c/w protonic infusion  - CBC Q8H  - CLD  - IVF  - avoid NSAIDS  - GI consult r/o GIB  - keep bed elevated 45 degrees  - zofran  - repeat labs    # LLL pulmonary nodule  - CT chest  - pulmonary consult    # Nicotine dependency  - nicotine patch  - encouraged smoking sensation     # Anxiety  - c/w valium    # Depression  - c/w Duloxetin  58-year-old female past medical history of GERD, anemia, anxiety, depression  presented to the emergency department with weakness, abdominal pain, black vomiting and stool.    # Upper GI bleed  - Hg trending down from 10 - 9.1  - Pt seen by critical care  - c/w protonic infusion  - CBC Q8H  - CLD  - IVF  - avoid NSAIDS  - GI consult r/o GIB  - keep bed elevated 45 degrees  - zofran  - repeat labs    # LLL pulmonary nodule  - CT chest  - pulmonary consult    # Nicotine dependency  - nicotine patch  - encouraged smoking sensation     # Anxiety  - c/w valium    # Depression  - c/w Duloxetin       #Progress Note Handoff  Pending (specify):  as above   Family discussion:  plan of care was discussed with patient and family in details.  all questions were answered.  seems to understand, and in agreement  Disposition:  home

## 2023-12-29 NOTE — H&P ADULT - HISTORY OF PRESENT ILLNESS
58-year-old female past medical history of GERD, anxiety, depression who presents to the emergency department with weakness, abdominal pain, black vomiting and stool.  Patient has not felt well over the past several days and reports a lot of weakness.  Patient took Pepto-Bismol last night due to an upset stomach and woke up today and was very nauseous and did not feel right.  After dropping grand child off to school she returned home and vomited black.  Patient later had black diarrhea.  Patient reports having endoscopy and colonoscopy with Dr. Rawls a few years ago.  Patient denies any cough, congestion, chest pain, shortness of breath.  Patient denies any urinary symptoms.  Patient reports sharp abdominal pain in her periumbilical area, left and right lower quadrants. 58-year-old female past medical history of GERD, anxiety, depression  presented to the emergency department with weakness, abdominal pain, black vomiting and stool.  Patient has not felt well over the past several days and reports a lot of weakness.  Patient took Pepto-Bismol last night due to an upset stomach and woke up today and was very nauseous and did not feel right.  Patient reports sharp 8/10 abdominal pain in her periumbilical area, left and right lower quadrants. After dropping grand child off to school she returned home and vomited black x1.  Patient later had black diarrhea x1.  Patient reports having endoscopy and colonoscopy with Dr. Rawls a few years ago.  Patient denies any cough, congestion, chest pain, shortness of breath, any urinary symptoms.  58-year-old female past medical history of GERD, anemia, anxiety, depression  presented to the emergency department with weakness, abdominal pain, black vomiting and stool.  Patient has not felt well over the past several days and reports a lot of weakness.  Patient took Pepto-Bismol last night due to an upset stomach and woke up today and was very nauseous and did not feel right.  Patient reports sharp 8/10 abdominal pain in her periumbilical area, left and right lower quadrants. After dropping grand child off to school she returned home and vomited black x1.  Patient later had black diarrhea x1.  Patient reports having endoscopy and colonoscopy with Dr. Rawls a few years ago.  Patient denies any cough, congestion, chest pain, shortness of breath, any urinary symptoms.

## 2023-12-29 NOTE — ED PROVIDER NOTE - DIFFERENTIAL DIAGNOSIS
The differential diagnosis for patients clinical presentation includes but is not limited to:  UGIB, colitis, pancreatitis, UTI, infection, Diverticulitis, SBO, biliary colic, cholecystitis, pyelonephritis, aortic dissection  PUD Differential Diagnosis

## 2023-12-29 NOTE — ED PROVIDER NOTE - CRITICAL CARE ATTENDING CONTRIBUTION TO CARE
I agree with the PA documentation and have performed the substantiate of amount of history, physical exam, medical decision making.    I personally evaluated patient. I agree with the findings and plan with all documentation on chart except as documented  in my note.    58-year-old female past medical history of GERD, anxiety, depression who presents to the emergency department with weakness, abdominal pain, black vomiting and stool.  Patient has not felt well over the past several days and reports a lot of weakness.  Patient took Pepto-Bismol last night due to an upset stomach and woke up today and was very nauseous and did not feel right.  After dropping grand child off to school she returned home and vomited black.  Patient later had black diarrhea.  Patient reports having endoscopy and colonoscopy with Dr. Rawls a few years ago.  Patient denies any cough, congestion, chest pain, shortness of breath.  Patient denies any urinary symptoms.  Patient reports sharp abdominal pain in her periumbilical area, left and right lower quadrants.    On exam, vital signs reviewed.  Patient appears mildly dehydrated and is tachycardic.  Patient is afebrile.  Patient has normal heart and lung exams other than fast heart rate.  Patient has a soft abdomen with tenderness to epigastric/periumbilical and lower quadrant on both right and left side.  No CVA tenderness.  Large-bore IV access obtained and full lab sent and will get CT scan of the abdomen pelvis, urine studies.  Will give IV Protonix and IV fluid resuscitation.  Will consult GI and follow-up hemoglobin and hematocrit and compared to prior values.

## 2023-12-29 NOTE — CONSULT NOTE ADULT - SUBJECTIVE AND OBJECTIVE BOX
Pt is a 57 yo female with a PMH of Tobacco abuse, GERD, Anxiety/Depression who presents with complaint of coffee ground emesis and melena which occurred today. The pt states that she has been feeling lethargic for the past 2 weeks and notes that today while driving her granddaughter to school, she was feeling dizzy. Additionally, the pt states that her gait was unbalanced due to dizziness and lethargy. Due to increasing lethergy and episode of melena and emesis, the pt presented to the ED. Upon ED presentation, the pt was hemodynamically stable on room air with complaint of nausea, which was alleviated with Zofran. Pt had CT ABD conducted showing evidence of duodenal thickening with possible presence of ulcer prompting critical care consult. Upon critical care evaluation, the pt was awake and alert and answering all questions appropriately. The pt denies CP, chest tightness, palpitations, SOB, dyspnea, constipation, diarrhea, LE pain, HA and dizziness but admits to epigastric pain and nausea which she states is improved in comparison to previous. No other complaints noted at this time.    ROS:  denies CP, chest tightness, palpitations, SOB, dyspnea, constipation, diarrhea, LE pain, HA and dizziness but admits to epigastric pain and nausea which she states is improved       T(C): 36.6 (12-29-23 @ 16:29), Max: 36.6 (12-29-23 @ 16:29)  HR: 111 (12-29-23 @ 16:29) (111 - 111)  BP: 144/74 (12-29-23 @ 16:29) (144/74 - 144/74)  RR: 18 (12-29-23 @ 16:29) (18 - 18)  SpO2: 99% (12-29-23 @ 16:29) (99% - 99%)    CONSTITUTIONAL: Well groomed, no apparent distress  EYES:EOMI, No conjunctival or scleral injection, non-icteric  ENMT: Oral mucosa with moist membranes  RESP: No respiratory distress, no use of accessory muscles; CTA b/l, no WRR  CV: RRR, +S1S2  GI: Soft, NT, ND, no rebound, no guarding; no palpable masses  LYMPH: No cervical LAD or tenderness  MSK: No digital clubbing or cyanosis  SKIN: No rashes or ulcers noted  NEURO: sensation intact in upper and lower extremities b/l to light touch   PSYCH: Appropriate insight/judgment; A+O x 3, mood and affect appropriate, recent/remote memory intact    Labs:                      10.0   9.43  )-----------( 261      ( 29 Dec 2023 18:26 )             29.5     12-29    142  |  108  |  30<H>  ----------------------------<  113<H>  4.7   |  25  |  0.6<L>    Ca    9.3      29 Dec 2023 18:26    TPro  6.5  /  Alb  4.1  /  TBili  <0.2  /  DBili  x   /  AST  17  /  ALT  8   /  AlkPhos  68  12-29   Pt is a 59 yo female with a PMH of Tobacco abuse, GERD, Anxiety/Depression who presents with complaint of coffee ground emesis and melena which occurred today. The pt states that she has been feeling lethargic for the past 2 weeks and notes that today while driving her granddaughter to school, she was feeling dizzy. Additionally, the pt states that her gait was unbalanced due to dizziness and lethargy. Due to increasing lethergy and episode of melena and emesis, the pt presented to the ED. Upon ED presentation, the pt was hemodynamically stable on room air with complaint of nausea, which was alleviated with Zofran. Pt had CT ABD conducted showing evidence of duodenal thickening with possible presence of ulcer prompting critical care consult. Upon critical care evaluation, the pt was awake and alert and answering all questions appropriately. The pt denies CP, chest tightness, palpitations, SOB, dyspnea, constipation, diarrhea, LE pain, HA and dizziness but admits to epigastric pain and nausea which she states is improved in comparison to previous. No other complaints noted at this time.    ROS:  denies CP, chest tightness, palpitations, SOB, dyspnea, constipation, diarrhea, LE pain, HA and dizziness but admits to epigastric pain and nausea which she states is improved       T(C): 36.6 (12-29-23 @ 16:29), Max: 36.6 (12-29-23 @ 16:29)  HR: 111 (12-29-23 @ 16:29) (111 - 111)  BP: 144/74 (12-29-23 @ 16:29) (144/74 - 144/74)  RR: 18 (12-29-23 @ 16:29) (18 - 18)  SpO2: 99% (12-29-23 @ 16:29) (99% - 99%)    CONSTITUTIONAL: Well groomed, no apparent distress  EYES:EOMI, No conjunctival or scleral injection, non-icteric  ENMT: Oral mucosa with moist membranes  RESP: No respiratory distress, no use of accessory muscles; CTA b/l, no WRR  CV: RRR, +S1S2  GI: Soft, NT, ND, no rebound, no guarding; no palpable masses  LYMPH: No cervical LAD or tenderness  MSK: No digital clubbing or cyanosis  SKIN: No rashes or ulcers noted  NEURO: sensation intact in upper and lower extremities b/l to light touch   PSYCH: Appropriate insight/judgment; A+O x 3, mood and affect appropriate, recent/remote memory intact    Labs:                      10.0   9.43  )-----------( 261      ( 29 Dec 2023 18:26 )             29.5     12-29    142  |  108  |  30<H>  ----------------------------<  113<H>  4.7   |  25  |  0.6<L>    Ca    9.3      29 Dec 2023 18:26    TPro  6.5  /  Alb  4.1  /  TBili  <0.2  /  DBili  x   /  AST  17  /  ALT  8   /  AlkPhos  68  12-29

## 2023-12-29 NOTE — ED ADULT NURSE NOTE - NSFALLRISKINTERV_ED_ALL_ED
Communicate fall risk and risk factors to all staff, patient, and family/Provide visual cue: yellow wristband, yellow gown, etc/Reinforce activity limits and safety measures with patient and family/Call bell, personal items and telephone in reach/Instruct patient to call for assistance before getting out of bed/chair/stretcher/Non-slip footwear applied when patient is off stretcher/[place] to call system/Physically safe environment - no spills, clutter or unnecessary equipment/Purposeful Proactive Rounding/Room/bathroom lighting operational, light cord in reach Communicate fall risk and risk factors to all staff, patient, and family/Provide visual cue: yellow wristband, yellow gown, etc/Reinforce activity limits and safety measures with patient and family/Call bell, personal items and telephone in reach/Instruct patient to call for assistance before getting out of bed/chair/stretcher/Non-slip footwear applied when patient is off stretcher/[place] to call system/Physically safe environment - no spills, clutter or unnecessary equipment/Purposeful Proactive Rounding/Room/bathroom lighting operational, light cord in reach

## 2023-12-29 NOTE — H&P ADULT - NSHPPHYSICALEXAM_GEN_ALL_CORE
VITALS:   T(C): 36.6 (12-29-23 @ 16:29), Max: 36.6 (12-29-23 @ 16:29)  HR: 111 (12-29-23 @ 16:29) (111 - 111)  BP: 144/74 (12-29-23 @ 16:29) (144/74 - 144/74)  RR: 18 (12-29-23 @ 16:29) (18 - 18)  SpO2: 99% (12-29-23 @ 16:29) (99% - 99%)    GENERAL: NAD, lying in bed comfortably, pleasant  HEAD:  Atraumatic, Normocephalic  EYES: EOMI,   NECK: Supple, No JVD  CHEST/LUNG: CTA b/l,  Unlabored respirations  HEART: Regular rate and rhythm; + murmur;, rubs, or gallops  ABDOMEN: Bowel sounds present; Soft, + diffuse ttp all 4 quad  EXTREMITIES:  No clubbing, cyanosis, or edema  NERVOUS SYSTEM:  Alert & Oriented X3, speech clear. No deficits   MSK: FROM all 4 extremities, full and equal strength  SKIN: No rashes or lesions

## 2023-12-29 NOTE — PATIENT PROFILE ADULT - REASON FOR REFUSAL (REFER PATIENT TO HEALTHCARE PROVIDER FOR FOLLOW-UP):
[facility] Call [facility]    Phone Message    May a detailed message be left on voicemail: yes     Reason for Call: Other: Patient calling back regarding the results of her 6 minute walk as well as restriction forms for returning to work, she must return on 4/1/2023 and needs information regarding that, please advise thank you     Action Taken: Other: pulm    Travel Screening: Not Applicable                                                                       does not want

## 2023-12-29 NOTE — PATIENT PROFILE ADULT - NSPROGENSOURCEINFO_GEN_A_NUR
[de-identified] : 28 year old female followup with back pain after an injury at work on 9/26/2022.  She was draining water from a cart and the handle on the cart broke causing her to fall. She denied LOC.  She tried continuing with work but the back pain progressively worsened.  She denies radicular pain, recent illness, fevers, numbness, weakness, balance problems, saddle anesthesia, urinary retention or fecal incontinence. Her pain has improved since her last appointment.  She feels ready to return to work.   patient

## 2023-12-29 NOTE — PATIENT PROFILE ADULT - FALL HARM RISK - HARM RISK INTERVENTIONS
Communicate Risk of Fall with Harm to all staff/Reinforce activity limits and safety measures with patient and family/Tailored Fall Risk Interventions/Visual Cue: Yellow wristband and red socks/Bed in lowest position, wheels locked, appropriate side rails in place/Call bell, personal items and telephone in reach/Instruct patient to call for assistance before getting out of bed or chair/Non-slip footwear when patient is out of bed/[place] to call system/Physically safe environment - no spills, clutter or unnecessary equipment/Purposeful Proactive Rounding/Room/bathroom lighting operational, light cord in reach Communicate Risk of Fall with Harm to all staff/Reinforce activity limits and safety measures with patient and family/Tailored Fall Risk Interventions/Visual Cue: Yellow wristband and red socks/Bed in lowest position, wheels locked, appropriate side rails in place/Call bell, personal items and telephone in reach/Instruct patient to call for assistance before getting out of bed or chair/Non-slip footwear when patient is out of bed/[place] to call system/Physically safe environment - no spills, clutter or unnecessary equipment/Purposeful Proactive Rounding/Room/bathroom lighting operational, light cord in reach

## 2023-12-29 NOTE — ED PROVIDER NOTE - OBJECTIVE STATEMENT
patient c/o abd pain, N/V today, dark / black vomitus and black stools, 1 episode this am, H/o gerd , PUD,  no chest pain no SOB

## 2023-12-29 NOTE — H&P ADULT - NSHPLABSRESULTS_GEN_ALL_CORE
LABS:  cret                        9.1    8.64  )-----------( 244      ( 29 Dec 2023 21:51 )             26.6     12-29    142  |  108  |  30<H>  ----------------------------<  113<H>  4.7   |  25  |  0.6<L>    Ca    9.3      29 Dec 2023 18:26    TPro  6.5  /  Alb  4.1  /  TBili  <0.2  /  DBili  x   /  AST  17  /  ALT  8   /  AlkPhos  68  12-29    PT/INR - ( 29 Dec 2023 18:26 )   PT: 11.50 sec;   INR: 1.01 ratio         PTT - ( 29 Dec 2023 18:26 )  PTT:31.4 sec        RADIOLOGY:    CT Abdomen and Pelvis w/ IV Cont (12.29.23 @ 18:49)     IMPRESSION:    Nonspecific thickening of the second portion of the duodenum which could   represent inflammatory change or ulcer.    4 mm left lower lobe solid pulmonary nodule.

## 2023-12-29 NOTE — CONSULT NOTE ADULT - PROBLEM SELECTOR RECOMMENDATION 2
- CT ABD shows presence of 4mm LLL nodule  - Pt has longstanding smoking history  - Recommend to obtain dedicated CT chest, follow up results  - Pt will need surveillance of nodule as an outpatient as pt is considered high risk in the setting of current smoking history

## 2023-12-29 NOTE — ED PROVIDER NOTE - NS ED ATTENDING STATEMENT MOD
This was a shared visit with the MONTRELL. I reviewed and verified the documentation and independently performed the documented: I have personally provided the amount of critical care time documented below excluding time spent on separate procedures.

## 2023-12-29 NOTE — CONSULT NOTE ADULT - NSCONSULTADDITIONALINFOA_GEN_ALL_CORE
Thank you for the opportunity to participate in the care of this patient. Pt does not meet criteria for ICU admission. Please reconsult as needed. Plan of care discussed with pt and hospitalist, all questions answered and concerns addressed.

## 2023-12-29 NOTE — ED PROVIDER NOTE - CLINICAL SUMMARY MEDICAL DECISION MAKING FREE TEXT BOX
Hemglobin lower than prior values. ICU consulted for care and patient being admitted to Telemetry. PPI drip started and patient remains HD stable. CT scan reviewed and concerning for findings in duodenum. Patient has an elevated BUN/Cr ratio and clinical picture concerning for UGIB.      ED work up reviewed and results and plan of care discussed with patient. Patient requires admission for further work up, monitoring, and management. Need for admission discussed with patient.

## 2023-12-29 NOTE — CONSULT NOTE ADULT - PROBLEM SELECTOR RECOMMENDATION 9
- CT ABD reviewed and results discussed with the patient  - Continue with protonix drip  - Maintenance IVF hydration  - CBC q8 hours daily, maintain Hgb level greater than 8  - GI consult, follow up recommendations  - Avoids NSAIDs  - Clear liquid diet for now and adjust based on GI recommendations

## 2023-12-30 LAB
ALBUMIN SERPL ELPH-MCNC: 3.6 G/DL — SIGNIFICANT CHANGE UP (ref 3.5–5.2)
ALBUMIN SERPL ELPH-MCNC: 3.6 G/DL — SIGNIFICANT CHANGE UP (ref 3.5–5.2)
ALP SERPL-CCNC: 57 U/L — SIGNIFICANT CHANGE UP (ref 30–115)
ALP SERPL-CCNC: 57 U/L — SIGNIFICANT CHANGE UP (ref 30–115)
ALT FLD-CCNC: 7 U/L — SIGNIFICANT CHANGE UP (ref 0–41)
ALT FLD-CCNC: 7 U/L — SIGNIFICANT CHANGE UP (ref 0–41)
ANION GAP SERPL CALC-SCNC: 9 MMOL/L — SIGNIFICANT CHANGE UP (ref 7–14)
ANION GAP SERPL CALC-SCNC: 9 MMOL/L — SIGNIFICANT CHANGE UP (ref 7–14)
AST SERPL-CCNC: 13 U/L — SIGNIFICANT CHANGE UP (ref 0–41)
AST SERPL-CCNC: 13 U/L — SIGNIFICANT CHANGE UP (ref 0–41)
BILIRUB SERPL-MCNC: 0.3 MG/DL — SIGNIFICANT CHANGE UP (ref 0.2–1.2)
BILIRUB SERPL-MCNC: 0.3 MG/DL — SIGNIFICANT CHANGE UP (ref 0.2–1.2)
BUN SERPL-MCNC: 22 MG/DL — HIGH (ref 10–20)
BUN SERPL-MCNC: 22 MG/DL — HIGH (ref 10–20)
CALCIUM SERPL-MCNC: 8.4 MG/DL — SIGNIFICANT CHANGE UP (ref 8.4–10.5)
CALCIUM SERPL-MCNC: 8.4 MG/DL — SIGNIFICANT CHANGE UP (ref 8.4–10.5)
CHLORIDE SERPL-SCNC: 109 MMOL/L — SIGNIFICANT CHANGE UP (ref 98–110)
CHLORIDE SERPL-SCNC: 109 MMOL/L — SIGNIFICANT CHANGE UP (ref 98–110)
CO2 SERPL-SCNC: 25 MMOL/L — SIGNIFICANT CHANGE UP (ref 17–32)
CO2 SERPL-SCNC: 25 MMOL/L — SIGNIFICANT CHANGE UP (ref 17–32)
CREAT SERPL-MCNC: 0.6 MG/DL — LOW (ref 0.7–1.5)
CREAT SERPL-MCNC: 0.6 MG/DL — LOW (ref 0.7–1.5)
EGFR: 103 ML/MIN/1.73M2 — SIGNIFICANT CHANGE UP
EGFR: 103 ML/MIN/1.73M2 — SIGNIFICANT CHANGE UP
GLUCOSE SERPL-MCNC: 98 MG/DL — SIGNIFICANT CHANGE UP (ref 70–99)
GLUCOSE SERPL-MCNC: 98 MG/DL — SIGNIFICANT CHANGE UP (ref 70–99)
HCT VFR BLD CALC: 22 % — LOW (ref 37–47)
HCT VFR BLD CALC: 22 % — LOW (ref 37–47)
HCT VFR BLD CALC: 24.3 % — LOW (ref 37–47)
HCT VFR BLD CALC: 24.3 % — LOW (ref 37–47)
HCT VFR BLD CALC: 24.8 % — LOW (ref 37–47)
HCT VFR BLD CALC: 24.8 % — LOW (ref 37–47)
HCT VFR BLD CALC: 24.9 % — LOW (ref 37–47)
HCT VFR BLD CALC: 24.9 % — LOW (ref 37–47)
HGB BLD-MCNC: 7.7 G/DL — LOW (ref 12–16)
HGB BLD-MCNC: 7.7 G/DL — LOW (ref 12–16)
HGB BLD-MCNC: 8.2 G/DL — LOW (ref 12–16)
HGB BLD-MCNC: 8.2 G/DL — LOW (ref 12–16)
HGB BLD-MCNC: 8.5 G/DL — LOW (ref 12–16)
HGB BLD-MCNC: 8.5 G/DL — LOW (ref 12–16)
HGB BLD-MCNC: 8.7 G/DL — LOW (ref 12–16)
HGB BLD-MCNC: 8.7 G/DL — LOW (ref 12–16)
MCHC RBC-ENTMCNC: 32.8 PG — HIGH (ref 27–31)
MCHC RBC-ENTMCNC: 32.8 PG — HIGH (ref 27–31)
MCHC RBC-ENTMCNC: 32.9 PG — HIGH (ref 27–31)
MCHC RBC-ENTMCNC: 32.9 PG — HIGH (ref 27–31)
MCHC RBC-ENTMCNC: 33 PG — HIGH (ref 27–31)
MCHC RBC-ENTMCNC: 33.7 G/DL — SIGNIFICANT CHANGE UP (ref 32–37)
MCHC RBC-ENTMCNC: 33.7 G/DL — SIGNIFICANT CHANGE UP (ref 32–37)
MCHC RBC-ENTMCNC: 34.3 G/DL — SIGNIFICANT CHANGE UP (ref 32–37)
MCHC RBC-ENTMCNC: 34.3 G/DL — SIGNIFICANT CHANGE UP (ref 32–37)
MCHC RBC-ENTMCNC: 34.9 G/DL — SIGNIFICANT CHANGE UP (ref 32–37)
MCHC RBC-ENTMCNC: 34.9 G/DL — SIGNIFICANT CHANGE UP (ref 32–37)
MCHC RBC-ENTMCNC: 35 G/DL — SIGNIFICANT CHANGE UP (ref 32–37)
MCHC RBC-ENTMCNC: 35 G/DL — SIGNIFICANT CHANGE UP (ref 32–37)
MCV RBC AUTO: 94.3 FL — SIGNIFICANT CHANGE UP (ref 81–99)
MCV RBC AUTO: 94.3 FL — SIGNIFICANT CHANGE UP (ref 81–99)
MCV RBC AUTO: 94.4 FL — SIGNIFICANT CHANGE UP (ref 81–99)
MCV RBC AUTO: 94.4 FL — SIGNIFICANT CHANGE UP (ref 81–99)
MCV RBC AUTO: 96.1 FL — SIGNIFICANT CHANGE UP (ref 81–99)
MCV RBC AUTO: 96.1 FL — SIGNIFICANT CHANGE UP (ref 81–99)
MCV RBC AUTO: 97.2 FL — SIGNIFICANT CHANGE UP (ref 81–99)
MCV RBC AUTO: 97.2 FL — SIGNIFICANT CHANGE UP (ref 81–99)
NRBC # BLD: 0 /100 WBCS — SIGNIFICANT CHANGE UP (ref 0–0)
PLATELET # BLD AUTO: 199 K/UL — SIGNIFICANT CHANGE UP (ref 130–400)
PLATELET # BLD AUTO: 199 K/UL — SIGNIFICANT CHANGE UP (ref 130–400)
PLATELET # BLD AUTO: 230 K/UL — SIGNIFICANT CHANGE UP (ref 130–400)
PLATELET # BLD AUTO: 238 K/UL — SIGNIFICANT CHANGE UP (ref 130–400)
PLATELET # BLD AUTO: 238 K/UL — SIGNIFICANT CHANGE UP (ref 130–400)
PMV BLD: 10.1 FL — SIGNIFICANT CHANGE UP (ref 7.4–10.4)
PMV BLD: 10.1 FL — SIGNIFICANT CHANGE UP (ref 7.4–10.4)
PMV BLD: 10.3 FL — SIGNIFICANT CHANGE UP (ref 7.4–10.4)
PMV BLD: 10.3 FL — SIGNIFICANT CHANGE UP (ref 7.4–10.4)
PMV BLD: 10.4 FL — SIGNIFICANT CHANGE UP (ref 7.4–10.4)
PMV BLD: 10.4 FL — SIGNIFICANT CHANGE UP (ref 7.4–10.4)
PMV BLD: 9.9 FL — SIGNIFICANT CHANGE UP (ref 7.4–10.4)
PMV BLD: 9.9 FL — SIGNIFICANT CHANGE UP (ref 7.4–10.4)
POTASSIUM SERPL-MCNC: 4 MMOL/L — SIGNIFICANT CHANGE UP (ref 3.5–5)
POTASSIUM SERPL-MCNC: 4 MMOL/L — SIGNIFICANT CHANGE UP (ref 3.5–5)
POTASSIUM SERPL-SCNC: 4 MMOL/L — SIGNIFICANT CHANGE UP (ref 3.5–5)
POTASSIUM SERPL-SCNC: 4 MMOL/L — SIGNIFICANT CHANGE UP (ref 3.5–5)
PROT SERPL-MCNC: 5.3 G/DL — LOW (ref 6–8)
PROT SERPL-MCNC: 5.3 G/DL — LOW (ref 6–8)
RBC # BLD: 2.33 M/UL — LOW (ref 4.2–5.4)
RBC # BLD: 2.33 M/UL — LOW (ref 4.2–5.4)
RBC # BLD: 2.5 M/UL — LOW (ref 4.2–5.4)
RBC # BLD: 2.5 M/UL — LOW (ref 4.2–5.4)
RBC # BLD: 2.58 M/UL — LOW (ref 4.2–5.4)
RBC # BLD: 2.58 M/UL — LOW (ref 4.2–5.4)
RBC # BLD: 2.64 M/UL — LOW (ref 4.2–5.4)
RBC # BLD: 2.64 M/UL — LOW (ref 4.2–5.4)
RBC # FLD: 12.7 % — SIGNIFICANT CHANGE UP (ref 11.5–14.5)
RBC # FLD: 12.7 % — SIGNIFICANT CHANGE UP (ref 11.5–14.5)
RBC # FLD: 12.9 % — SIGNIFICANT CHANGE UP (ref 11.5–14.5)
RBC # FLD: 13 % — SIGNIFICANT CHANGE UP (ref 11.5–14.5)
RBC # FLD: 13 % — SIGNIFICANT CHANGE UP (ref 11.5–14.5)
SODIUM SERPL-SCNC: 143 MMOL/L — SIGNIFICANT CHANGE UP (ref 135–146)
SODIUM SERPL-SCNC: 143 MMOL/L — SIGNIFICANT CHANGE UP (ref 135–146)
WBC # BLD: 5.38 K/UL — SIGNIFICANT CHANGE UP (ref 4.8–10.8)
WBC # BLD: 5.38 K/UL — SIGNIFICANT CHANGE UP (ref 4.8–10.8)
WBC # BLD: 5.86 K/UL — SIGNIFICANT CHANGE UP (ref 4.8–10.8)
WBC # BLD: 5.86 K/UL — SIGNIFICANT CHANGE UP (ref 4.8–10.8)
WBC # BLD: 8.03 K/UL — SIGNIFICANT CHANGE UP (ref 4.8–10.8)
WBC # BLD: 8.03 K/UL — SIGNIFICANT CHANGE UP (ref 4.8–10.8)
WBC # BLD: 8.29 K/UL — SIGNIFICANT CHANGE UP (ref 4.8–10.8)
WBC # BLD: 8.29 K/UL — SIGNIFICANT CHANGE UP (ref 4.8–10.8)
WBC # FLD AUTO: 5.38 K/UL — SIGNIFICANT CHANGE UP (ref 4.8–10.8)
WBC # FLD AUTO: 5.38 K/UL — SIGNIFICANT CHANGE UP (ref 4.8–10.8)
WBC # FLD AUTO: 5.86 K/UL — SIGNIFICANT CHANGE UP (ref 4.8–10.8)
WBC # FLD AUTO: 5.86 K/UL — SIGNIFICANT CHANGE UP (ref 4.8–10.8)
WBC # FLD AUTO: 8.03 K/UL — SIGNIFICANT CHANGE UP (ref 4.8–10.8)
WBC # FLD AUTO: 8.03 K/UL — SIGNIFICANT CHANGE UP (ref 4.8–10.8)
WBC # FLD AUTO: 8.29 K/UL — SIGNIFICANT CHANGE UP (ref 4.8–10.8)
WBC # FLD AUTO: 8.29 K/UL — SIGNIFICANT CHANGE UP (ref 4.8–10.8)

## 2023-12-30 PROCEDURE — 99233 SBSQ HOSP IP/OBS HIGH 50: CPT

## 2023-12-30 PROCEDURE — 71250 CT THORAX DX C-: CPT | Mod: 26

## 2023-12-30 RX ORDER — ACETAMINOPHEN 500 MG
650 TABLET ORAL ONCE
Refills: 0 | Status: COMPLETED | OUTPATIENT
Start: 2023-12-30 | End: 2023-12-30

## 2023-12-30 RX ADMIN — Medication 1 PATCH: at 13:36

## 2023-12-30 RX ADMIN — Medication 1 PATCH: at 20:34

## 2023-12-30 RX ADMIN — Medication 10 MILLIGRAM(S): at 14:48

## 2023-12-30 RX ADMIN — Medication 650 MILLIGRAM(S): at 20:34

## 2023-12-30 RX ADMIN — DULOXETINE HYDROCHLORIDE 30 MILLIGRAM(S): 30 CAPSULE, DELAYED RELEASE ORAL at 13:36

## 2023-12-30 RX ADMIN — PANTOPRAZOLE SODIUM 10 MG/HR: 20 TABLET, DELAYED RELEASE ORAL at 14:41

## 2023-12-30 RX ADMIN — SODIUM CHLORIDE 75 MILLILITER(S): 9 INJECTION INTRAMUSCULAR; INTRAVENOUS; SUBCUTANEOUS at 14:42

## 2023-12-30 RX ADMIN — SODIUM CHLORIDE 75 MILLILITER(S): 9 INJECTION INTRAMUSCULAR; INTRAVENOUS; SUBCUTANEOUS at 14:48

## 2023-12-30 RX ADMIN — Medication 650 MILLIGRAM(S): at 18:42

## 2023-12-30 NOTE — PROGRESS NOTE ADULT - SUBJECTIVE AND OBJECTIVE BOX
Progress note    INTERVAL HPI/OVERNIGHT EVENTS:    Patient seen and examined at bedside. No acute bloody bowel movements.      REVIEW OF SYSTEMS:  All other 13 Review of systems were reviewed and are negative    FAMILY HISTORY:    T(C): 36.2 (12-30-23 @ 13:38), Max: 37.2 (12-29-23 @ 22:00)  HR: 79 (12-30-23 @ 13:38) (61 - 80)  BP: 120/57 (12-30-23 @ 13:38) (104/55 - 120/57)  RR: 18 (12-30-23 @ 13:38) (17 - 18)  SpO2: 98% (12-30-23 @ 13:38) (97% - 98%)  Wt(kg): --Vital Signs Last 24 Hrs  T(C): 36.2 (30 Dec 2023 13:38), Max: 37.2 (29 Dec 2023 22:00)  T(F): 97.2 (30 Dec 2023 13:38), Max: 99 (29 Dec 2023 22:00)  HR: 79 (30 Dec 2023 13:38) (61 - 80)  BP: 120/57 (30 Dec 2023 13:38) (104/55 - 120/57)  BP(mean): --  RR: 18 (30 Dec 2023 13:38) (17 - 18)  SpO2: 98% (30 Dec 2023 13:38) (97% - 98%)    Parameters below as of 30 Dec 2023 05:05  Patient On (Oxygen Delivery Method): room air      No Known Allergies      PHYSICAL EXAM:    GENERAL: NAD, lying in bed comfortably, pleasant  HEAD:  Atraumatic, Normocephalic  EYES: EOMI,   NECK: Supple, No JVD  CHEST/LUNG: CTA b/l,  Unlabored respirations  HEART: Regular rate and rhythm; + murmur;, rubs, or gallops  ABDOMEN: Bowel sounds present; Soft, + diffuse ttp all 4 quad  EXTREMITIES:  No clubbing, cyanosis, or edema  NERVOUS SYSTEM:  Alert & Oriented X3, speech clear. No deficits   MSK: FROM all 4 extremities, full and equal strength  SKIN: No rashes or lesions    Consultant(s) Notes Reviewed:  [x ] YES  [ ] NO  Care Discussed with Consultants/Other Providers [ x] YES  [ ] NO    LABS:      RADIOLOGY & ADDITIONAL TESTS:    Imaging Personally Reviewed:  [ ] YES  [ ] NO  acetaminophen     Tablet .. 650 milliGRAM(s) Oral every 6 hours PRN  aluminum hydroxide/magnesium hydroxide/simethicone Suspension 30 milliLiter(s) Oral every 4 hours PRN  diazepam    Tablet 10 milliGRAM(s) Oral every 12 hours PRN  DULoxetine 30 milliGRAM(s) Oral daily  nicotine - 21 mG/24Hr(s) Patch 1 Patch Transdermal daily  ondansetron Injectable 4 milliGRAM(s) IV Push every 8 hours PRN  pantoprazole Infusion 8 mG/Hr IV Continuous <Continuous>  sodium chloride 0.9%. 1000 milliLiter(s) IV Continuous <Continuous>      HEALTH ISSUES - PROBLEM Dx:    58-year-old female past medical history of GERD, anemia, anxiety, depression  presented to the emergency department with weakness, abdominal pain, black vomiting and stool.    # Upper GI bleed  - Hg trending down from 10 - 9.1-->7.7-->pending (repeat CBC tonight and transfuse if <7.0)  - Pt seen by critical care and deemed not ICU candidate  - c/w protonic infusion  - CBC Q8H  - CLD  - IVF  - avoid NSAIDS  - GI consult r/o GIB  - keep bed elevated 45 degrees  - zofran    # LLL pulmonary nodule  - CT chest  - pulmonary consult    # Nicotine dependency  - nicotine patch  - encouraged smoking sensation     # Anxiety  - c/w valium    # Depression  - c/w Duloxetin     Acute

## 2023-12-31 ENCOUNTER — TRANSCRIPTION ENCOUNTER (OUTPATIENT)
Age: 58
End: 2023-12-31

## 2023-12-31 VITALS
DIASTOLIC BLOOD PRESSURE: 56 MMHG | OXYGEN SATURATION: 98 % | HEART RATE: 69 BPM | TEMPERATURE: 98 F | SYSTOLIC BLOOD PRESSURE: 113 MMHG | RESPIRATION RATE: 18 BRPM

## 2023-12-31 LAB
ANION GAP SERPL CALC-SCNC: 6 MMOL/L — LOW (ref 7–14)
ANION GAP SERPL CALC-SCNC: 6 MMOL/L — LOW (ref 7–14)
APPEARANCE UR: CLEAR — SIGNIFICANT CHANGE UP
APPEARANCE UR: CLEAR — SIGNIFICANT CHANGE UP
BACTERIA # UR AUTO: ABNORMAL /HPF
BACTERIA # UR AUTO: ABNORMAL /HPF
BILIRUB UR-MCNC: NEGATIVE — SIGNIFICANT CHANGE UP
BILIRUB UR-MCNC: NEGATIVE — SIGNIFICANT CHANGE UP
BUN SERPL-MCNC: 11 MG/DL — SIGNIFICANT CHANGE UP (ref 10–20)
BUN SERPL-MCNC: 11 MG/DL — SIGNIFICANT CHANGE UP (ref 10–20)
CALCIUM SERPL-MCNC: 8.7 MG/DL — SIGNIFICANT CHANGE UP (ref 8.4–10.5)
CALCIUM SERPL-MCNC: 8.7 MG/DL — SIGNIFICANT CHANGE UP (ref 8.4–10.5)
CHLORIDE SERPL-SCNC: 107 MMOL/L — SIGNIFICANT CHANGE UP (ref 98–110)
CHLORIDE SERPL-SCNC: 107 MMOL/L — SIGNIFICANT CHANGE UP (ref 98–110)
CO2 SERPL-SCNC: 28 MMOL/L — SIGNIFICANT CHANGE UP (ref 17–32)
CO2 SERPL-SCNC: 28 MMOL/L — SIGNIFICANT CHANGE UP (ref 17–32)
COLOR SPEC: YELLOW — SIGNIFICANT CHANGE UP
COLOR SPEC: YELLOW — SIGNIFICANT CHANGE UP
CREAT SERPL-MCNC: 0.7 MG/DL — SIGNIFICANT CHANGE UP (ref 0.7–1.5)
CREAT SERPL-MCNC: 0.7 MG/DL — SIGNIFICANT CHANGE UP (ref 0.7–1.5)
DIFF PNL FLD: NEGATIVE — SIGNIFICANT CHANGE UP
DIFF PNL FLD: NEGATIVE — SIGNIFICANT CHANGE UP
EGFR: 100 ML/MIN/1.73M2 — SIGNIFICANT CHANGE UP
EGFR: 100 ML/MIN/1.73M2 — SIGNIFICANT CHANGE UP
EPI CELLS # UR: PRESENT
EPI CELLS # UR: PRESENT
GLUCOSE SERPL-MCNC: 96 MG/DL — SIGNIFICANT CHANGE UP (ref 70–99)
GLUCOSE SERPL-MCNC: 96 MG/DL — SIGNIFICANT CHANGE UP (ref 70–99)
GLUCOSE UR QL: NEGATIVE MG/DL — SIGNIFICANT CHANGE UP
GLUCOSE UR QL: NEGATIVE MG/DL — SIGNIFICANT CHANGE UP
HCT VFR BLD CALC: 24.3 % — LOW (ref 37–47)
HCT VFR BLD CALC: 24.3 % — LOW (ref 37–47)
HGB BLD-MCNC: 8.6 G/DL — LOW (ref 12–16)
HGB BLD-MCNC: 8.6 G/DL — LOW (ref 12–16)
KETONES UR-MCNC: ABNORMAL MG/DL
KETONES UR-MCNC: ABNORMAL MG/DL
LEUKOCYTE ESTERASE UR-ACNC: ABNORMAL
LEUKOCYTE ESTERASE UR-ACNC: ABNORMAL
MCHC RBC-ENTMCNC: 33.6 PG — HIGH (ref 27–31)
MCHC RBC-ENTMCNC: 33.6 PG — HIGH (ref 27–31)
MCHC RBC-ENTMCNC: 35.4 G/DL — SIGNIFICANT CHANGE UP (ref 32–37)
MCHC RBC-ENTMCNC: 35.4 G/DL — SIGNIFICANT CHANGE UP (ref 32–37)
MCV RBC AUTO: 94.9 FL — SIGNIFICANT CHANGE UP (ref 81–99)
MCV RBC AUTO: 94.9 FL — SIGNIFICANT CHANGE UP (ref 81–99)
NITRITE UR-MCNC: NEGATIVE — SIGNIFICANT CHANGE UP
NITRITE UR-MCNC: NEGATIVE — SIGNIFICANT CHANGE UP
NRBC # BLD: 0 /100 WBCS — SIGNIFICANT CHANGE UP (ref 0–0)
NRBC # BLD: 0 /100 WBCS — SIGNIFICANT CHANGE UP (ref 0–0)
PH UR: 6 — SIGNIFICANT CHANGE UP (ref 5–8)
PH UR: 6 — SIGNIFICANT CHANGE UP (ref 5–8)
PLATELET # BLD AUTO: 229 K/UL — SIGNIFICANT CHANGE UP (ref 130–400)
PLATELET # BLD AUTO: 229 K/UL — SIGNIFICANT CHANGE UP (ref 130–400)
PMV BLD: 10.4 FL — SIGNIFICANT CHANGE UP (ref 7.4–10.4)
PMV BLD: 10.4 FL — SIGNIFICANT CHANGE UP (ref 7.4–10.4)
POTASSIUM SERPL-MCNC: 4.3 MMOL/L — SIGNIFICANT CHANGE UP (ref 3.5–5)
POTASSIUM SERPL-MCNC: 4.3 MMOL/L — SIGNIFICANT CHANGE UP (ref 3.5–5)
POTASSIUM SERPL-SCNC: 4.3 MMOL/L — SIGNIFICANT CHANGE UP (ref 3.5–5)
POTASSIUM SERPL-SCNC: 4.3 MMOL/L — SIGNIFICANT CHANGE UP (ref 3.5–5)
PROT UR-MCNC: NEGATIVE MG/DL — SIGNIFICANT CHANGE UP
PROT UR-MCNC: NEGATIVE MG/DL — SIGNIFICANT CHANGE UP
RBC # BLD: 2.56 M/UL — LOW (ref 4.2–5.4)
RBC # BLD: 2.56 M/UL — LOW (ref 4.2–5.4)
RBC # FLD: 12.8 % — SIGNIFICANT CHANGE UP (ref 11.5–14.5)
RBC # FLD: 12.8 % — SIGNIFICANT CHANGE UP (ref 11.5–14.5)
RBC CASTS # UR COMP ASSIST: 3 /HPF — SIGNIFICANT CHANGE UP (ref 0–4)
RBC CASTS # UR COMP ASSIST: 3 /HPF — SIGNIFICANT CHANGE UP (ref 0–4)
SODIUM SERPL-SCNC: 141 MMOL/L — SIGNIFICANT CHANGE UP (ref 135–146)
SODIUM SERPL-SCNC: 141 MMOL/L — SIGNIFICANT CHANGE UP (ref 135–146)
SP GR SPEC: 1.01 — SIGNIFICANT CHANGE UP (ref 1–1.03)
SP GR SPEC: 1.01 — SIGNIFICANT CHANGE UP (ref 1–1.03)
UROBILINOGEN FLD QL: 0.2 MG/DL — SIGNIFICANT CHANGE UP (ref 0.2–1)
UROBILINOGEN FLD QL: 0.2 MG/DL — SIGNIFICANT CHANGE UP (ref 0.2–1)
WBC # BLD: 6.18 K/UL — SIGNIFICANT CHANGE UP (ref 4.8–10.8)
WBC # BLD: 6.18 K/UL — SIGNIFICANT CHANGE UP (ref 4.8–10.8)
WBC # FLD AUTO: 6.18 K/UL — SIGNIFICANT CHANGE UP (ref 4.8–10.8)
WBC # FLD AUTO: 6.18 K/UL — SIGNIFICANT CHANGE UP (ref 4.8–10.8)
WBC UR QL: 12 /HPF — HIGH (ref 0–5)
WBC UR QL: 12 /HPF — HIGH (ref 0–5)

## 2023-12-31 PROCEDURE — 99252 IP/OBS CONSLTJ NEW/EST SF 35: CPT

## 2023-12-31 PROCEDURE — 99253 IP/OBS CNSLTJ NEW/EST LOW 45: CPT

## 2023-12-31 PROCEDURE — 99239 HOSP IP/OBS DSCHRG MGMT >30: CPT

## 2023-12-31 RX ORDER — PANTOPRAZOLE SODIUM 20 MG/1
1 TABLET, DELAYED RELEASE ORAL
Qty: 30 | Refills: 0
Start: 2023-12-31 | End: 2024-01-29

## 2023-12-31 RX ORDER — DULOXETINE HYDROCHLORIDE 30 MG/1
0 CAPSULE, DELAYED RELEASE ORAL
Qty: 0 | Refills: 0 | DISCHARGE

## 2023-12-31 RX ORDER — DIAZEPAM 5 MG
1 TABLET ORAL
Qty: 0 | Refills: 0 | DISCHARGE

## 2023-12-31 RX ORDER — DULOXETINE HYDROCHLORIDE 30 MG/1
1 CAPSULE, DELAYED RELEASE ORAL
Qty: 0 | Refills: 0 | DISCHARGE
Start: 2023-12-31

## 2023-12-31 RX ADMIN — Medication 1 PATCH: at 07:04

## 2023-12-31 RX ADMIN — PANTOPRAZOLE SODIUM 10 MG/HR: 20 TABLET, DELAYED RELEASE ORAL at 06:50

## 2023-12-31 RX ADMIN — DULOXETINE HYDROCHLORIDE 30 MILLIGRAM(S): 30 CAPSULE, DELAYED RELEASE ORAL at 13:00

## 2023-12-31 RX ADMIN — Medication 10 MILLIGRAM(S): at 09:08

## 2023-12-31 RX ADMIN — Medication 1 PATCH: at 13:02

## 2023-12-31 RX ADMIN — SODIUM CHLORIDE 75 MILLILITER(S): 9 INJECTION INTRAMUSCULAR; INTRAVENOUS; SUBCUTANEOUS at 06:50

## 2023-12-31 NOTE — DISCHARGE NOTE NURSING/CASE MANAGEMENT/SOCIAL WORK - NSDCPEFALRISK_GEN_ALL_CORE
For information on Fall & Injury Prevention, visit: [email or web address] OR  [email or web address] OR  https://www.cdc.gov/steadi/patient.html For information on Fall & Injury Prevention, visit: [email or web address] OR  [email or web address] OR  https://www.cdc.gov/steadi/patient.html

## 2023-12-31 NOTE — DISCHARGE NOTE PROVIDER - NSDCCPCAREPLAN_GEN_ALL_CORE_FT
PRINCIPAL DISCHARGE DIAGNOSIS  Diagnosis: Upper GI bleed  Assessment and Plan of Treatment: you were admitted for   Upper GI bleed,  seen by critical care and deemed not ICU candidate, you  had CT a/p showed Nonspecific thickening of the second portion of the duodenum which could represent inflammatory change or ulcer.  Hbg initially  trending down from 10 - 9.1-->7.7, now lateral  8.7>8.6, pt treated with protonic infusion, tolerating clears, denies abdominal pain. follow up with GI as outpt.         SECONDARY DISCHARGE DIAGNOSES  Diagnosis: Lung nodule  Assessment and Plan of Treatment: CT chest showed Bilateral lower lobe 4 mm   pulmonary nodules, will need oupt pulmonology follow up for biopsy vs repeat CT chest  Pt to follow up with pcp.     PRINCIPAL DISCHARGE DIAGNOSIS  Diagnosis: Upper GI bleed  Assessment and Plan of Treatment: You were admitted for Upper GI bleed,  seen by critical care and deemed not ICU candidate, you  had CT a/p showed Nonspecific thickening of the second portion of the duodenum which could represent inflammatory change or ulcer.  Hbg initially  trending down from 10 - 9.1-->7.7, now Stabilized at8.7>8.6, pt treated with protonic infusion, tolerating clears, denies abdominal pain. follow up with GI as outpt with Dr. Rawls. Please do not take any NSAIDS such as ibuprofen or Aspirin. Please return to the ER if you have any symptoms of dizziness, vomiting up any blood or bloody diarrhea.         SECONDARY DISCHARGE DIAGNOSES  Diagnosis: Lung nodule  Assessment and Plan of Treatment: CT chest showed Bilateral lower lobe 4 mm   pulmonary nodules, will need oupt pulmonology follow up for biopsy vs repeat CT chest  Pt to follow up with pcp.

## 2023-12-31 NOTE — CONSULT NOTE ADULT - ASSESSMENT
2 pulmonary nodules one each lower lobe.  present and unchanged since 2010 therefore post inflammatory and benign,  active smoker    SUGGEST:  d/c smoking  yearly screening CT chest starting 12/24
57yo female h/o GERD, anemia, anxiety, depression presenting with weakness, abdominal pain, black emesis and dark stool found to have anemia. No recurrent overt GI bleeding. Feels well.     #Normocytic anemia  #Reported dark stools/emesis  Possibly dark in setting of peptobismol, refused rectal exam, no bms since admission  CT imaging showing nonspecific thickening of the second portion of the duodenum which could   represent inflammatory change or ulcer.  -Recommend EGD to further evaluate r/o PUD or other etiology, discussed risks/benefits including potential for missed lesion if not pursued. Pt verbalized understanding and declines EGD, prefers to be discharged, feeling well and wants to follow up outpt   -Recommend PPI daily   -Monitor Hb and for bleeding  -Avoid nonessential NSAIDs  -Check iron studies/ferritin when able  -If pt refuses to stay for further inpt testing recommend resume outpt GI follow up with Dr. Rawls      #4 mm left lower lobe solid pulmonary nodule  -Recommendations per pulmonary    Discussed with medicine team  
1. Upper GI bleed  2. Pulmonary nodule  3. Tobacco abuse

## 2023-12-31 NOTE — DISCHARGE NOTE PROVIDER - NSDCMRMEDTOKEN_GEN_ALL_CORE_FT
DIAZEPAM 10 MG TABLET: TAKE 1 TABLET BY MOUTH TWICE A DAY AS NEEDED FOR 30 DAYS  DULOXETINE HCL DR 30 MG CAP: TAKE 1 CAPSULE BY MOUTH EVERY DAY FOR 30 DAYS  ibuprofen 400 mg oral tablet: 1 tab(s) orally every 8 hours, As Needed - for pain   DIAZEPAM 10 MG TABLET: 1 tab(s) orally 2 times a day as needed for  anxiety TAKE 1 TABLET BY MOUTH TWICE A DAY AS NEEDED FOR 30 DAYS  DULoxetine 30 mg oral delayed release capsule: 1 cap(s) orally once a day  pantoprazole 40 mg oral delayed release tablet: 1 tab(s) orally once a day

## 2023-12-31 NOTE — DISCHARGE NOTE PROVIDER - NPI NUMBER (FOR SYSADMIN USE ONLY) :
[[id number]] [[id number]] [[id number]],[[id number]] [[id number]]

## 2023-12-31 NOTE — DISCHARGE NOTE PROVIDER - CARE PROVIDER_API CALL
Mark Rothman  Critical Care Medicine  [address] [place] [address]  Phone: [phone]  Fax: [phone]  Follow Up Time: 1 week    Saeed العراقي.  Internal Medicine  [address] [place] [address]  Phone: [phone]  Fax: [phone]  Follow Up Time: 1 week   Mark Rothman  Critical Care Medicine  [address] [place] [id number]  Phone: [phone]  Fax: [phone]  Follow Up Time: 1 week    Saeed العراقي.  Internal Medicine  [address] [place] [address]  Phone: [phone]  Fax: [phone]  Follow Up Time: 1 week   Mark Rothman  Critical Care Medicine  [address] [place] [address]  Phone: [phone]  Fax: [phone]  Follow Up Time: 1 week    Saeed العراقي.  Internal Medicine  [address] [place] [address] 1  [place] [address]  Phone: [phone]  Fax: [phone]  Follow Up Time: 1 week    Summer Rawls  Gastroenterology  [address] [place] [address]  Phone: [phone]  Fax: [phone]  Follow Up Time:    Mark Rothman  Critical Care Medicine  [address] [place] [address]  Phone: [phone]  Fax: [phone]  Follow Up Time: 1 week    Saeed العراقي.  Internal Medicine  [address] [place] [address] 1  [place] [address]  Phone: [phone]  Fax: [phone]  Follow Up Time: 1 week    Summer Rawls  Gastroenterology  [address] [place] [address]  Phone: [phone]  Fax: [phone]  Follow Up Time:

## 2023-12-31 NOTE — CONSULT NOTE ADULT - SUBJECTIVE AND OBJECTIVE BOX
Gastroenterology Consultation:    Patient is a 58y old  Female who presents with a chief complaint of melena (31 Dec 2023 11:57)        Admitted on: 23      HPI:  57yo female h/o GERD, anemia, anxiety, depression presenting with weakness, abdominal pain, black vomiting and dark stool. Pt reports stomach discomfort over the past week, took Pepto-Bismol on thursday and reports episode of dark emesis and dark stools on Friday prompting ED evaluation. She reports mostly mid to lower abdominal pain, now resolved. Reports regular formed bm at baseline, no bm since admission. No prior blood in stools.  Takes ibuprofen for many years intermittentlyfor back pain.  EGD/colonoscopy by Dr. Rawls 2-3 years ago, told was ok, though was started on creon. Has not been on PPI.  Appetite ok, denies weight loss. No known family h/o GI malignancy.       PAST MEDICAL & SURGICAL HISTORY:  Anxiety and depression      Anemia      H/O  section            FAMILY HISTORY: noncontributoru      Social History:  Tobacco: half pack every 3 days  Alcohol: denies  Drugs: denies    Home Medications:  DIAZEPAM 10 MG TABLET: 1 tab(s) orally 2 times a day as needed for  anxiety TAKE 1 TABLET BY MOUTH TWICE A DAY AS NEEDED FOR 30 DAYS (31 Dec 2023 12:42)  DULoxetine 30 mg oral delayed release capsule: 1 cap(s) orally once a day (31 Dec 2023 12:42)        MEDICATIONS  (STANDING):  DULoxetine 30 milliGRAM(s) Oral daily  nicotine - 21 mG/24Hr(s) Patch 1 Patch Transdermal daily  pantoprazole Infusion 8 mG/Hr (10 mL/Hr) IV Continuous <Continuous>  sodium chloride 0.9%. 1000 milliLiter(s) (75 mL/Hr) IV Continuous <Continuous>    MEDICATIONS  (PRN):  acetaminophen     Tablet .. 650 milliGRAM(s) Oral every 6 hours PRN Temp greater or equal to 38C (100.4F), Mild Pain (1 - 3)  aluminum hydroxide/magnesium hydroxide/simethicone Suspension 30 milliLiter(s) Oral every 4 hours PRN Dyspepsia  diazepam    Tablet 10 milliGRAM(s) Oral every 12 hours PRN anxiety  ondansetron Injectable 4 milliGRAM(s) IV Push every 8 hours PRN Nausea and/or Vomiting      Allergies  No Known Allergies      Review of Systems:   Constitutional:  No Fever, No Chills  ENT/Mouth:  No Hearing Changes,  No Difficulty Swallowing  Eyes:  No Eye Pain, No Vision Changes  Cardiovascular:  No Chest Pain, No Palpitations  Respiratory:  No Cough, No Dyspnea  Gastrointestinal:  As described in HPI  Musculoskeletal:  No Joint Swelling, No Back Pain  Skin:  No Skin Lesions, No Jaundice  Neuro:  No Syncope, No Dizziness  Heme/Lymph:  No Bruising, No Bleeding.          Physical Examination:  T(C): 36 (23 @ 04:45), Max: 36.8 (23 @ 20:32)  HR: 75 (23 @ 04:45) (71 - 79)  BP: 96/53 (23 @ 04:45) (96/53 - 120/57)  RR: 18 (23 @ 04:45) (16 - 18)  SpO2: 94% (23 @ 04:45) (94% - 98%)          GENERAL: AAOx3, no acute distress.  HEAD:  Atraumatic, Normocephalic  EYES: conjunctiva and sclera clear  NECK: Supple, no JVD or thyromegaly  CHEST/LUNG: Clear to auscultation bilaterally; No wheeze, rhonchi, or rales  HEART: Regular rate and rhythm; normal S1, S2, No murmurs.  ABDOMEN: Soft, nontender, nondistended; Bowel sounds present, declined rectal exam  NEUROLOGY: No asterixis or tremor.   SKIN: Intact, no jaundice        Data:                        8.6    6.18  )-----------( 229      ( 31 Dec 2023 07:35 )             24.3     Hgb Trend:  8.6  23 @ 07:35  8.7  23 @ 21:25  7.7  23 @ 15:37  8.2  23 @ 07:23  8.5  23 @ 22:47  9.1  23 @ 21:51  10.0  23 @ 18:26          141  |  107  |  11  ----------------------------<  96  4.3   |  28  |  0.7    Ca    8.7      31 Dec 2023 07:35    TPro  5.3<L>  /  Alb  3.6  /  TBili  0.3  /  DBili  x   /  AST  13  /  ALT  7   /  AlkPhos  57      Liver panel trend:  TBili 0.3   /   AST 13   /   ALT 7   /   AlkP 57   /   Tptn 5.3   /   Alb 3.6    /   DBili --        TBili <0.2   /   AST 17   /   ALT 8   /   AlkP 68   /   Tptn 6.5   /   Alb 4.1    /   DBili --            PT/INR - ( 29 Dec 2023 18:26 )   PT: 11.50 sec;   INR: 1.01 ratio         PTT - ( 29 Dec 2023 18:26 )  PTT:31.4 sec        Radiology:    CT abd/pelvis:  IMPRESSION:    Nonspecific thickening of the second portion of the duodenum which could   represent inflammatory change or ulcer.    4 mm left lower lobe solid pulmonary nodule.           Gastroenterology Consultation:    Patient is a 58y old  Female who presents with a chief complaint of melena (31 Dec 2023 11:57)        Admitted on: 23      HPI:  59yo female h/o GERD, anemia, anxiety, depression presenting with weakness, abdominal pain, black vomiting and dark stool. Pt reports stomach discomfort over the past week, took Pepto-Bismol on thursday and reports episode of dark emesis and dark stools on Friday prompting ED evaluation. She reports mostly mid to lower abdominal pain, now resolved. Reports regular formed bm at baseline, no bm since admission. No prior blood in stools.  Takes ibuprofen for many years intermittentlyfor back pain.  EGD/colonoscopy by Dr. Rawls 2-3 years ago, told was ok, though was started on creon. Has not been on PPI.  Appetite ok, denies weight loss. No known family h/o GI malignancy.       PAST MEDICAL & SURGICAL HISTORY:  Anxiety and depression      Anemia      H/O  section            FAMILY HISTORY: noncontributoru      Social History:  Tobacco: half pack every 3 days  Alcohol: denies  Drugs: denies    Home Medications:  DIAZEPAM 10 MG TABLET: 1 tab(s) orally 2 times a day as needed for  anxiety TAKE 1 TABLET BY MOUTH TWICE A DAY AS NEEDED FOR 30 DAYS (31 Dec 2023 12:42)  DULoxetine 30 mg oral delayed release capsule: 1 cap(s) orally once a day (31 Dec 2023 12:42)        MEDICATIONS  (STANDING):  DULoxetine 30 milliGRAM(s) Oral daily  nicotine - 21 mG/24Hr(s) Patch 1 Patch Transdermal daily  pantoprazole Infusion 8 mG/Hr (10 mL/Hr) IV Continuous <Continuous>  sodium chloride 0.9%. 1000 milliLiter(s) (75 mL/Hr) IV Continuous <Continuous>    MEDICATIONS  (PRN):  acetaminophen     Tablet .. 650 milliGRAM(s) Oral every 6 hours PRN Temp greater or equal to 38C (100.4F), Mild Pain (1 - 3)  aluminum hydroxide/magnesium hydroxide/simethicone Suspension 30 milliLiter(s) Oral every 4 hours PRN Dyspepsia  diazepam    Tablet 10 milliGRAM(s) Oral every 12 hours PRN anxiety  ondansetron Injectable 4 milliGRAM(s) IV Push every 8 hours PRN Nausea and/or Vomiting      Allergies  No Known Allergies      Review of Systems:   Constitutional:  No Fever, No Chills  ENT/Mouth:  No Hearing Changes,  No Difficulty Swallowing  Eyes:  No Eye Pain, No Vision Changes  Cardiovascular:  No Chest Pain, No Palpitations  Respiratory:  No Cough, No Dyspnea  Gastrointestinal:  As described in HPI  Musculoskeletal:  No Joint Swelling, No Back Pain  Skin:  No Skin Lesions, No Jaundice  Neuro:  No Syncope, No Dizziness  Heme/Lymph:  No Bruising, No Bleeding.          Physical Examination:  T(C): 36 (23 @ 04:45), Max: 36.8 (23 @ 20:32)  HR: 75 (23 @ 04:45) (71 - 79)  BP: 96/53 (23 @ 04:45) (96/53 - 120/57)  RR: 18 (23 @ 04:45) (16 - 18)  SpO2: 94% (23 @ 04:45) (94% - 98%)          GENERAL: AAOx3, no acute distress.  HEAD:  Atraumatic, Normocephalic  EYES: conjunctiva and sclera clear  NECK: Supple, no JVD or thyromegaly  CHEST/LUNG: Clear to auscultation bilaterally; No wheeze, rhonchi, or rales  HEART: Regular rate and rhythm; normal S1, S2, No murmurs.  ABDOMEN: Soft, nontender, nondistended; Bowel sounds present, declined rectal exam  NEUROLOGY: No asterixis or tremor.   SKIN: Intact, no jaundice        Data:                        8.6    6.18  )-----------( 229      ( 31 Dec 2023 07:35 )             24.3     Hgb Trend:  8.6  23 @ 07:35  8.7  23 @ 21:25  7.7  23 @ 15:37  8.2  23 @ 07:23  8.5  23 @ 22:47  9.1  23 @ 21:51  10.0  23 @ 18:26          141  |  107  |  11  ----------------------------<  96  4.3   |  28  |  0.7    Ca    8.7      31 Dec 2023 07:35    TPro  5.3<L>  /  Alb  3.6  /  TBili  0.3  /  DBili  x   /  AST  13  /  ALT  7   /  AlkPhos  57      Liver panel trend:  TBili 0.3   /   AST 13   /   ALT 7   /   AlkP 57   /   Tptn 5.3   /   Alb 3.6    /   DBili --        TBili <0.2   /   AST 17   /   ALT 8   /   AlkP 68   /   Tptn 6.5   /   Alb 4.1    /   DBili --            PT/INR - ( 29 Dec 2023 18:26 )   PT: 11.50 sec;   INR: 1.01 ratio         PTT - ( 29 Dec 2023 18:26 )  PTT:31.4 sec        Radiology:    CT abd/pelvis:  IMPRESSION:    Nonspecific thickening of the second portion of the duodenum which could   represent inflammatory change or ulcer.    4 mm left lower lobe solid pulmonary nodule.

## 2023-12-31 NOTE — CONSULT NOTE ADULT - SUBJECTIVE AND OBJECTIVE BOX
HPI:  58-year-old female past medical history of GERD, anemia, anxiety, depression  presented to the emergency department with weakness, abdominal pain, black vomiting and stool.  Patient has not felt well over the past several days and reports a lot of weakness.  Patient took Pepto-Bismol last night due to an upset stomach and woke up today and was very nauseous and did not feel right.  Patient reports sharp 8/10 abdominal pain in her periumbilical area, left and right lower quadrants. After dropping grand child off to school she returned home and vomited black x1.  Patient later had black diarrhea x1.  Patient reports having endoscopy and colonoscopy with Dr. Rawls a few years ago.  Patient denies any cough, congestion, chest pain, shortness of breath, any urinary symptoms.  (29 Dec 2023 21:02)    Pulmonary nodule was seen on CT abdomen.       I reviewed the radiology tests and hospital record including the ED chart.    I reviewed the other consultants comments that are available in the chart.    CC/ HPI Patient is a 58y old  Female who presents with a chief complaint of melena (31 Dec 2023 10:27)      Gastrointestinal hemorrhage      Anxiety and depression    Anemia      Upper GI bleed    Pulmonary nodule    Tobacco abuse    H/O  section    ABD PAIN AND SOB    Lung nodule      FAMILY HISTORY:      No Known Allergies      Soc:  see Hpi.    Home prescriptions  DIAZEPAM 10 MG TABLET: TAKE 1 TABLET BY MOUTH TWICE A DAY AS NEEDED FOR 30 DAYS  DULOXETINE HCL DR 30 MG CAP: TAKE 1 CAPSULE BY MOUTH EVERY DAY FOR 30 DAYS  ibuprofen 400 mg oral tablet: 1 tab(s) orally every 8 hours, As Needed - for pain      MEDICATIONS  (STANDING):  DULoxetine 30 milliGRAM(s) Oral daily  nicotine - 21 mG/24Hr(s) Patch 1 Patch Transdermal daily  pantoprazole Infusion 8 mG/Hr (10 mL/Hr) IV Continuous <Continuous>  sodium chloride 0.9%. 1000 milliLiter(s) (75 mL/Hr) IV Continuous <Continuous>    MEDICATIONS  (PRN):  acetaminophen     Tablet .. 650 milliGRAM(s) Oral every 6 hours PRN Temp greater or equal to 38C (100.4F), Mild Pain (1 - 3)  aluminum hydroxide/magnesium hydroxide/simethicone Suspension 30 milliLiter(s) Oral every 4 hours PRN Dyspepsia  diazepam    Tablet 10 milliGRAM(s) Oral every 12 hours PRN anxiety  ondansetron Injectable 4 milliGRAM(s) IV Push every 8 hours PRN Nausea and/or Vomiting      sodium chloride 0.9%.: Solution, 1000 milliLiter(s) infuse at 75 mL/Hr  lactated ringers Bolus:   1000 milliLiter(s), IV Bolus, once, infuse over 60 Minute(s), Stop After 1 Doses      T(C): 36 (23 @ 04:45), Max: 36.8 (23 @ 20:32)  HR: 75 (23 @ 04:45) (71 - 79)  BP: 96/53 (23 @ 04:45) (96/53 - 120/57)  RR: 18 (23 @ 04:45) (16 - 18)  SpO2: 94% (23 @ 04:45) (94% - 98%)  ICU Vital Signs Last 24 Hrs  T(C): 36 (31 Dec 2023 04:45), Max: 36.8 (30 Dec 2023 20:32)  T(F): 96.8 (31 Dec 2023 04:45), Max: 98.2 (30 Dec 2023 20:32)  HR: 75 (31 Dec 2023 04:45) (71 - 79)  BP: 96/53 (31 Dec 2023 04:45) (96/53 - 120/57)  RR: 18 (31 Dec 2023 04:45) (16 - 18)  SpO2: 94% (31 Dec 2023 04:45) (94% - 98%)    O2 Parameters below as of 30 Dec 2023 20:32  Patient On (Oxygen Delivery Method): room air      I&O's Summary      I&O's Detail      Drug Dosing Weight    Weight (kg): 53 (29 Dec 2023 22:56)    LABS:                          8.6    6.18  )-----------( 229      ( 31 Dec 2023 07:35 )             24.3           141  |  107  |  11  ----------------------------<  96  4.3   |  28  |  0.7    Ca    8.7      31 Dec 2023 07:35    TPro  5.3<L>  /  Alb  3.6  /  TBili  0.3  /  DBili  x   /  AST  13  /  ALT  7   /  AlkPhos  57  12-30      LIVER FUNCTIONS - ( 30 Dec 2023 07:23 )  Alb: 3.6 g/dL / Pro: 5.3 g/dL / ALK PHOS: 57 U/L / ALT: 7 U/L / AST: 13 U/L / GGT: x                                       Urinalysis Basic - ( 31 Dec 2023 07:35 )    Color: x / Appearance: x / SG: x / pH: x  Gluc: 96 mg/dL / Ketone: x  / Bili: x / Urobili: x   Blood: x / Protein: x / Nitrite: x   Leuk Esterase: x / RBC: x / WBC x   Sq Epi: x / Non Sq Epi: x / Bacteria: x                        RADIOLOGY:  I have personally reviewed all chest and other pertinent radiology films.         REVIEW OF SYSTEMS:   see Hpi.    PHYSICAL EXAM:       · ENMT:   Airway patent,   Nasal mucosa clear.  Mouth with normal mucosa.   No thrush    · EYES:   Clear bilaterally,   pupils equal,   round and reactive to light.    · CARDIAC:   Normal rate,   regular rhythm.    Heart sounds S1, S2.   no thrills or bruits on palpitation  normal  cardiac impulse  No murmurs, no rubs or gallops on auscultation  no edema        CAROTID:   normal systolic impulse  no bruits    · RESPIRATORY:   no w/r/r/,   normal chest expansion  not tachypneic,  no retractions or use of accessory muscles  palpation of chest is normal with no fremitus  percussion of chest demonstrates no hyperresonance or dullness    · GASTROINTESTINAL:  Abdomen soft,   non-tender,   no guarding,   + BS  liver spleen not palpable      · MUSCULOSKELETAL:   range of motion is not limited,  no clubbing, cyanosis  no petechiae      · SKIN:   Skin normal color for race,   warm,   dry and intact.       · HEME LYMPH:   no splenomegaly.  No cervical  lymphadenopathy.  no inguinal lymphadenopathy

## 2023-12-31 NOTE — DISCHARGE NOTE PROVIDER - HOSPITAL COURSE
58-year-old female past medical history of GERD, anemia, anxiety, depression  presented to the emergency department with weakness, abdominal pain, black vomiting and stool.  Pt admitted for  Upper GI bleed,  seen by critical care and deemed not ICU candidate, Pt had CT a/p showed Nonspecific thickening of the second portion of the duodenum which could represent inflammatory change or ulcer.  Hbg initially  trending down from 10 - 9.1-->7.7, now lateral  8.7>8.6, pt treated with protonic infusion, tolerating clears, denies abdominal pain. pending GI evaluation.   CT chest showed L LL pulmonary nodule, will need oupt pulmonology follow up for biopsy vs repeat CT chest  Pt to follow up with pcp.        58-year-old female past medical history of GERD, anemia, anxiety, depression  presented to the emergency department with weakness, abdominal pain, black vomiting and stool.  Pt admitted for  Upper GI bleed,  seen by critical care and deemed not ICU candidate, Pt had CT a/p showed Nonspecific thickening of the second portion of the duodenum which could represent inflammatory change or ulcer.  Hbg initially  trending down from 10 - 9.1-->7.7, now lateral  8.7>8.6, pt treated with protonic infusion, tolerating clears, denies abdominal pain. I had a long discussion with patient and GI team. Patient is offered EGD but she is refusing EGD and does not wish to stay for it. I explained the risks and benefits of the procedure and she still refused it. At this time, VSS, Hb has stabilized. She was advised to avoid NSAIDS.  CT chest showed L LL pulmonary nodule, will need oupt pulmonology follow up for biopsy vs repeat CT chest  Pt to follow up with pcp.        58-year-old female past medical history of GERD, anemia, anxiety, depression  presented to the emergency department with weakness, abdominal pain, black vomiting and stool.  Pt admitted for  Upper GI bleed,  seen by critical care and deemed not ICU candidate, Pt had CT a/p showed Nonspecific thickening of the second portion of the duodenum which could represent inflammatory change or ulcer.  Hbg initially  trending down from 10 - 9.1-->7.7, now lateral  8.7>8.6, pt treated with protonic infusion, tolerating clears, denies abdominal pain. I had a long discussion with patient and GI team. Patient is offered EGD by GI team but she is refusing EGD and does not wish to stay for it. I explained the risks and benefits of the procedure and the need to proceed with it but she still refused it. At this time, VSS, Hb has stabilized. She was advised to avoid NSAIDS.  CT chest showed L LL pulmonary nodule, will need oupt pulmonology follow up for biopsy vs repeat CT chest  Pt to follow up with pcp.

## 2023-12-31 NOTE — DISCHARGE NOTE PROVIDER - CARE PROVIDERS DIRECT ADDRESSES
,DirectAddress_Unknown,[email or web address] ,DirectAddress_Unknown,[email or web address] ,DirectAddress_Unknown,[email or web address],DirectAddress_Unknown ,DirectAddress_Unknown,[email or web address],DirectAddress_Unknown

## 2023-12-31 NOTE — DISCHARGE NOTE NURSING/CASE MANAGEMENT/SOCIAL WORK - PATIENT PORTAL LINK FT
You can access the FollowMyHealth Patient Portal offered by [facility] by registering at the following website: [email or web address]. By joining [employer]’s FollowMyHealth portal, you will also be able to view your health information using other applications (apps) compatible with our system. You can access the FollowMyHealth Patient Portal offered by [facility] by registering at the following website: [email or web address]. By joining [employer]’s FollowMyHealth portal, you will also be able to view your health information using other applications (apps) compatible with our system.

## 2023-12-31 NOTE — DISCHARGE NOTE PROVIDER - PROVIDER TOKENS
PROVIDER:[TOKEN:[[id number]],FOLLOWUP:[1 week]],PROVIDER:[TOKEN:[[id number]],FOLLOWUP:[1 week]] PROVIDER:[TOKEN:[[id number]],FOLLOWUP:[1 week]],PROVIDER:[TOKEN:[[id number]],FOLLOWUP:[1 week]] PROVIDER:[TOKEN:[[id number]],FOLLOWUP:[1 week]],PROVIDER:[TOKEN:[[id number]],FOLLOWUP:[1 week]],PROVIDER:[TOKEN:[[id number]]] PROVIDER:[TOKEN:[[id number]],FOLLOWUP:[1 week]],PROVIDER:[TOKEN:[[id number]],FOLLOWUP:[1 week]],PROVIDER:[TOKEN:[[id number]]]

## 2024-01-06 DIAGNOSIS — D64.9 ANEMIA, UNSPECIFIED: ICD-10-CM

## 2024-01-06 DIAGNOSIS — Z53.20 PROCEDURE AND TREATMENT NOT CARRIED OUT BECAUSE OF PATIENT'S DECISION FOR UNSPECIFIED REASONS: ICD-10-CM

## 2024-01-06 DIAGNOSIS — F41.9 ANXIETY DISORDER, UNSPECIFIED: ICD-10-CM

## 2024-01-06 DIAGNOSIS — R91.1 SOLITARY PULMONARY NODULE: ICD-10-CM

## 2024-01-06 DIAGNOSIS — K21.9 GASTRO-ESOPHAGEAL REFLUX DISEASE WITHOUT ESOPHAGITIS: ICD-10-CM

## 2024-01-06 DIAGNOSIS — E86.0 DEHYDRATION: ICD-10-CM

## 2024-01-06 DIAGNOSIS — K92.2 GASTROINTESTINAL HEMORRHAGE, UNSPECIFIED: ICD-10-CM

## 2024-01-06 DIAGNOSIS — R00.0 TACHYCARDIA, UNSPECIFIED: ICD-10-CM

## 2024-01-06 DIAGNOSIS — F32.A DEPRESSION, UNSPECIFIED: ICD-10-CM

## 2024-01-06 DIAGNOSIS — Z72.0 TOBACCO USE: ICD-10-CM

## 2024-09-23 NOTE — ED ADULT TRIAGE NOTE - ARRIVAL FROM
[FreeTextEntry1] : Assessment: 71 yo RH WW with ongoing STM issues, mostly difficulty with names, recent events and conversations.  MS exam is otherwise ok. Motor exam ok. Not impressed with the testing, and ? evidence of neurodegenerative disease, from prior reported imaging, including FDG-PET (? LBD) and AVTAR (negative). On CTh there is a bit of FT atrophy, not striking per age. Slow processing and getting flustered.  Positive amyloid pet scan MMSE 27/30 (same as previous)   Diagnostic Impression: -forgetfulness NOS -cognitive changes  Plan: -MRI 3D dementia for lecanemab -APOE -Educated on importance of healthy lifestyle modifications such as daily exercise, healthy balanced diet and good sleep habits  -Started rivastigmine 1.5 mg daily as per Dr. Chung's last note  Home

## 2024-11-10 ENCOUNTER — NON-APPOINTMENT (OUTPATIENT)
Age: 59
End: 2024-11-10

## 2025-02-10 NOTE — PRE-OP CHECKLIST - ALLERGIES REVIEWED
Recent Visits  Date Type Provider Dept   01/24/25 Office Visit Earl Deutsch MD [facility]   10/04/24 Office Visit Earl Deutsch MD [facility]   Showing recent visits within past 180 days and meeting all other requirements  Future Appointments  Date Type Provider Dept   04/25/[facility]   Showing future appointments within next 90 days and meeting all other requirements      
done
done

## 2025-06-23 NOTE — CHART NOTE - NSCHARTNOTEFT_GEN_A_CORE
[facility] to reschedule appt.    Patient seen and examined throughout the course of the day.  Results of Labs/Imaging discussed as well as patient's plan.  All patient's questions answered.